# Patient Record
Sex: MALE | Race: WHITE | Employment: OTHER | URBAN - METROPOLITAN AREA
[De-identification: names, ages, dates, MRNs, and addresses within clinical notes are randomized per-mention and may not be internally consistent; named-entity substitution may affect disease eponyms.]

---

## 2021-10-07 ENCOUNTER — HOSPITAL ENCOUNTER (OUTPATIENT)
Dept: GENERAL RADIOLOGY | Age: 61
Discharge: HOME | End: 2021-10-09
Payer: MEDICARE

## 2021-10-07 ENCOUNTER — HOSPITAL ENCOUNTER (OUTPATIENT)
Age: 61
Discharge: HOME | End: 2021-10-09
Payer: MEDICARE

## 2021-10-07 DIAGNOSIS — M25.552 LEFT HIP PAIN: ICD-10-CM

## 2021-10-07 DIAGNOSIS — M23.92 DERANGEMENT OF COLLATERAL LIGAMENT OF LEFT KNEE: ICD-10-CM

## 2021-10-07 PROCEDURE — 73562 X-RAY EXAM OF KNEE 3: CPT

## 2021-10-07 PROCEDURE — 73502 X-RAY EXAM HIP UNI 2-3 VIEWS: CPT

## 2021-10-07 PROCEDURE — 72100 X-RAY EXAM L-S SPINE 2/3 VWS: CPT

## 2021-10-13 ENCOUNTER — OFFICE VISIT (OUTPATIENT)
Dept: ORTHOPEDIC SURGERY | Age: 61
End: 2021-10-13
Payer: MEDICARE

## 2021-10-13 VITALS — BODY MASS INDEX: 30.76 KG/M2 | HEIGHT: 67 IN | WEIGHT: 196 LBS

## 2021-10-13 DIAGNOSIS — M25.562 LEFT KNEE PAIN, UNSPECIFIED CHRONICITY: ICD-10-CM

## 2021-10-13 DIAGNOSIS — M17.12 PRIMARY OSTEOARTHRITIS OF LEFT KNEE: Primary | ICD-10-CM

## 2021-10-13 PROCEDURE — 99203 OFFICE O/P NEW LOW 30 MIN: CPT | Performed by: ORTHOPAEDIC SURGERY

## 2021-10-13 RX ORDER — FUROSEMIDE 20 MG/1
TABLET ORAL
COMMUNITY
Start: 2021-10-09

## 2021-10-13 RX ORDER — TAMSULOSIN HYDROCHLORIDE 0.4 MG/1
CAPSULE ORAL
COMMUNITY

## 2021-10-13 RX ORDER — VIT A/VIT C/VIT E/ZINC/COPPER 4296-226
CAPSULE ORAL DAILY
COMMUNITY

## 2021-10-13 NOTE — PROGRESS NOTES
LEFT KNEE TOTAL JOINT ARTHROPLASTY-Sam BRADLEY, [employer] - Advanced, general versus spinal anesthesia, Adductor Nerve Block, Date: Monday, November 8, 2021, Time: 11:30 am, Place: [address] [place] [address], Surgeon: Naman Can. Wayne . Reviewed medications with patient / holding no medications. Will inform PCP: Lubna Redding of plans for surgery. Patient does not have regular checkups with the dentist. Patient has dentures, upper and lower. Dietary Handout: Patient Education Guide Regarding Iron Replacement given to and reviewed with patient. Patient instructed to begin eating foods rich in Iron and Vitamin C one month pre op and continue for one month post op. Pre op instructions and Total Joint Folder given to the patient. Patient informed: A hospital nurse from Pre Admission Testing will contact him with a date for Pre Admission Testing and Mandatory Joint Class. Patient expresses understanding and is in agreement with the plan. After review of the pre op information at home; patient will call office with any questions, concerns or problems. Patient also informed: X-ray department from [facility] will contact him regarding a date and time CT scan which is required for Robot Assisted Surgery    Patient reports he saw a heart doctor (not in epic) this summer for chest pain.  A stress test, echocardiogram and 24 hr halter monitor - all negative    Patient has not had the Covid vaccine    Patient lives in a [facility] home, [facility] style, does not utilize second floor,   Has no steps to navigate  Uses a cane for ambulation when away from home    DME Needs: None    Post Op Appointment: Thursday, November 18 th at 2:45 pm

## 2021-10-13 NOTE — PROGRESS NOTES
Chief Complaint:   Chief Complaint   Patient presents with    Knee Pain     Left knee pain for several years progressively getting worse. States he has had several injuries over the years to left knee. Knee gives out, wearing hinged knee brace. Also c/o left hip and thigh pain. No xrays. Takes Advil or Aleve once in a while for pain. HPI     Alvin Weinberg is a 63 y.o. male, who presents with chronic many year history of progressive left knee pain, reports multiple nondescript injuries in the remote past, he is on permanent disability due to previous neck and back injuries although asymptomatic in that regard at this time. Left knee pain has not improved despite oral medication including OTC NSAIDs or Tylenol, patient does not wish to pursue injections, or physical therapy. Knee pain is constant aggravated by and disabling him from simple ADLs including standing walking stairs transfers and self-care. He has been wearing a brace with minimal relief. Allergies; medications; past medical, surgical, family, and social history; and problem list have been reviewed today and updated as indicated in this encounter - see below following Ortho specifics. Musculoskeletal: Overweight otherwise healthy-appearing middle-age male. Upper extremities grossly unremarkable leg lengths equal hip motion painless right knee benign, left knee is in valgus alignment partially correctable, motion limited from 5 to 120 degrees of flexion without medial lateral AP laxity. There is crepitus but no effusion or erythema. Knee is tender to palpation especially at the lateral joint line.     Radiologic Studies: Recent 4 view x-rays of the left knee show moderate patellofemoral wear and lateral condyle changes but preserved joint space, weightbearing AP x-rays today of the knees in fact show complete loss of lateral joint space with valgus deformity sclerosis and osteophyte formation consistent with end-stage valgus DJD left knee. ASSESSMENT/PLAN:    Sunday Hernandes was seen today for knee pain. Diagnoses and all orders for this visit:    Primary osteoarthritis of left knee    Left knee pain, unspecified chronicity  -     XR KNEE BILATERAL STANDING       Diagnosis and treatment options were reviewed, again the patient has taken oral medication and modified activities without relief, he does not wish to pursue injections or physical therapy and would rather prefer a more definitive approach. I reviewed the alternative of robotic assisted total knee arthroplasty with him including expected procedure likely risk benefits probable outcome, questions were asked answered and understood. At the patient's request this to be scheduled on a likely overnight stay basis and he will follow up in the office post discharge to include x-rays left knee. Return in about 27 days (around 11/9/2021). Myesha Landin MD    10/13/2021  5:34 PM      Patient Active Problem List   Diagnosis    Acute kidney injury ([facility] [address]    High anion gap metabolic acidosis    Essential hypertension    Hyperlipidemia LDL goal <100       Past Medical History:   Diagnosis Date    Hyperlipidemia     Hypertension     Leaky heart valve     Phalanx, proximal fracture of finger        Past Surgical History:   Procedure Laterality Date    BACK SURGERY      HAND SURGERY Right 02/04/2015    right hand debridment second proximal phalanx       Current Outpatient Medications   Medication Sig Dispense Refill    furosemide (LASIX) 20 MG tablet TAKE ONE TABLET BY MOUTH EVERY DAY      Multiple Vitamins-Minerals (PRESERVISION AREDS) CAPS Take by mouth daily       tamsulosin (FLOMAX) 0.4 MG capsule tamsulosin 0.4 mg capsule   TAKE ONE CAPSULE BY MOUTH EVERY DAY      gabapentin (NEURONTIN) 400 MG capsule Take 400 mg by mouth 3 times daily.  simvastatin (ZOCOR) 40 MG tablet Take 40 mg by mouth daily.  (Patient not taking: Reported on 10/13/2021)       No current facility-administered medications for this visit. No Known Allergies    Social History     Socioeconomic History    Marital status:      Spouse name: None    Number of children: None    Years of education: None    Highest education level: None   Occupational History    None   Tobacco Use    Smoking status: Current Every Day Smoker     Packs/day: 2.00     Years: 40.00     Pack years: 80.00    Smokeless tobacco: Never Used   Substance and Sexual Activity    Alcohol use: No    Drug use: No    Sexual activity: None   Other Topics Concern    None   Social History Narrative    None     Social Determinants of Health     Financial Resource Strain:     Difficulty of Paying Living Expenses:    Food Insecurity:     Worried About Running Out of Food in the Last Year:     Ran Out of Food in the Last Year:    Transportation Needs:     Lack of Transportation (Medical):  Lack of Transportation (Non-Medical):    Physical Activity:     Days of Exercise per Week:     Minutes of Exercise per Session:    Stress:     Feeling of Stress :    Social Connections:     Frequency of Communication with Friends and Family:     Frequency of Social Gatherings with Friends and Family:     Attends Sikh Services:     Active Member of Clubs or Organizations:     Attends Club or Organization Meetings:     Marital Status:    Intimate Partner Violence:     Fear of Current or Ex-Partner:     Emotionally Abused:     Physically Abused:     Sexually Abused:        No family history on file. Review of Systems  As follows except as previously noted in HPI:  Constitutional: Negative for chills, diaphoresis, fatigue, fever and unexpected weight change. Respiratory: Negative for cough, shortness of breath and wheezing. Cardiovascular: Negative for chest pain and palpitations. Neurological: Negative for dizziness, syncope, cephalgia.   GI / : negative  Musculoskeletal: see HPI       Objective: Physical Exam   Constitutional: Oriented to person, place, and time. and appears well-developed and well-nourished. :   Head: Normocephalic and atraumatic. Eyes: EOM are normal.   Neck: Neck supple. Cardiovascular: Normal rate and regular rhythm. Pulmonary/Chest: Effort normal. No stridor. No respiratory distress, no wheezes. Abdominal:  No abnormal distension. Neurological: Alert and oriented to person, place, and time. Skin: Skin is warm and dry. Psychiatric: Normal mood and affect.  Behavior is normal. Thought content normal.

## 2021-10-15 ENCOUNTER — TELEPHONE (OUTPATIENT)
Dept: ORTHOPEDIC SURGERY | Age: 61
End: 2021-10-15

## 2021-10-15 DIAGNOSIS — M17.12 PRIMARY OSTEOARTHRITIS OF LEFT KNEE: Primary | ICD-10-CM

## 2021-10-15 DIAGNOSIS — M25.562 LEFT KNEE PAIN, UNSPECIFIED CHRONICITY: ICD-10-CM

## 2021-10-18 ENCOUNTER — TELEPHONE (OUTPATIENT)
Dept: ORTHOPEDIC SURGERY | Age: 61
End: 2021-10-18

## 2021-10-18 NOTE — TELEPHONE ENCOUNTER
10/18/2021 Pre-Optimization Checklist faxed to [facility], nurse navigator, [facility], [place], [phone]  Surgery: BELINDA BRADLEY 11/08/2021 [facility]

## 2021-10-18 NOTE — TELEPHONE ENCOUNTER
10/18/2021 9:35 am Fax message to Dr. Rei Cheung [phone]: Notification of plans for surgery - BELINDA BRADLEY 11/08/2021 [facility]. Request Medical clearance for surgery. Other: Patient reports he recently saw a cardiologist for chest pain. Had a workup: Stress test, echocardiogram, halter monitor. Patient reports everything was 'OK'. Please send reports to Fax # [phone].   Holding the following medications for surgery: None

## 2021-10-19 ENCOUNTER — TELEPHONE (OUTPATIENT)
Dept: ORTHOPEDIC SURGERY | Age: 61
End: 2021-10-19

## 2021-10-19 NOTE — TELEPHONE ENCOUNTER
Prior Authorization    Procedure:  Left TKA [facility])  Procedure Code: 18818  Auth Number:  No prior auth required  Diagnosis Code:  M17.12  Date:  11/8/2021  Facility:  [address] [place] [address]  Status: OBS  Physician:  Marky Awad M.D.   Call Reference Number:  [id number]  Contact: Fouzia Rios

## 2021-10-25 ENCOUNTER — HOSPITAL ENCOUNTER (OUTPATIENT)
Dept: CT IMAGING | Age: 61
Discharge: HOME | End: 2021-10-27
Payer: MEDICARE

## 2021-10-25 DIAGNOSIS — M25.562 LEFT KNEE PAIN, UNSPECIFIED CHRONICITY: ICD-10-CM

## 2021-10-25 DIAGNOSIS — M17.12 PRIMARY OSTEOARTHRITIS OF LEFT KNEE: ICD-10-CM

## 2021-10-25 PROCEDURE — 73700 CT LOWER EXTREMITY W/O DYE: CPT

## 2021-10-28 ENCOUNTER — TELEPHONE (OUTPATIENT)
Dept: ORTHOPEDIC SURGERY | Age: 61
End: 2021-10-28

## 2021-10-28 NOTE — TELEPHONE ENCOUNTER
Late Entry 10/28/2021    10/27/2021 3:35 pm Received a fax from [facility], [facility] [address], [facility], Phone: [phone] Fax: [phone] which includes:    - A copy of office notes dated 10/27/2021 and signed by Dr. Surendra Mccallum. Encounter was for Surgery Clearance TLK 11/08/2021. Will clear at low risk for left total knee. Patient is also tobacco dependent, patient was counseled on smoking cessation, long term ramifications were reviewed. - Report of a Stress Test dated 9/01/21, ordered by Dr. Breezy Joaquin, cardiologist, completed at [facility].  - Report of a Transthoracic Echocardiography ([facility])  Dated 9/15/2021, ordered by Dr. Rae Green and completed at [facility].   - Patch Monitoring Report    Above scanned to media

## 2021-11-02 ENCOUNTER — PREP FOR PROCEDURE (OUTPATIENT)
Dept: ORTHOPEDIC SURGERY | Age: 61
End: 2021-11-02

## 2021-11-02 DIAGNOSIS — Z01.818 PRE-OP EVALUATION: Primary | ICD-10-CM

## 2021-11-02 RX ORDER — MELOXICAM 7.5 MG/1
7.5 TABLET ORAL ONCE
Status: CANCELLED | OUTPATIENT
Start: 2021-11-02 | End: 2021-11-02

## 2021-11-02 RX ORDER — SODIUM CHLORIDE 9 MG/ML
INJECTION, SOLUTION INTRAVENOUS CONTINUOUS
Status: CANCELLED | OUTPATIENT
Start: 2021-11-02

## 2021-11-02 RX ORDER — PREGABALIN 25 MG/1
75 CAPSULE ORAL ONCE
Status: CANCELLED | OUTPATIENT
Start: 2021-11-02 | End: 2021-11-02

## 2021-11-02 RX ORDER — SODIUM CHLORIDE 0.9 % (FLUSH) 0.9 %
10 SYRINGE (ML) INJECTION PRN
Status: CANCELLED | OUTPATIENT
Start: 2021-11-02

## 2021-11-02 RX ORDER — SODIUM CHLORIDE 9 MG/ML
25 INJECTION, SOLUTION INTRAVENOUS PRN
Status: CANCELLED | OUTPATIENT
Start: 2021-11-02

## 2021-11-02 RX ORDER — SODIUM CHLORIDE 0.9 % (FLUSH) 0.9 %
10 SYRINGE (ML) INJECTION EVERY 12 HOURS SCHEDULED
Status: CANCELLED | OUTPATIENT
Start: 2021-11-02

## 2021-11-02 RX ORDER — ACETAMINOPHEN 325 MG/1
1000 TABLET ORAL ONCE
Status: CANCELLED | OUTPATIENT
Start: 2021-11-02 | End: 2021-11-02

## 2021-11-03 ENCOUNTER — HOSPITAL ENCOUNTER (OUTPATIENT)
Age: 61
Discharge: HOME | End: 2021-11-05
Payer: MEDICARE

## 2021-11-03 ENCOUNTER — ANESTHESIA EVENT (OUTPATIENT)
Dept: OPERATING ROOM | Age: 61
End: 2021-11-03
Payer: MEDICARE

## 2021-11-03 ENCOUNTER — HOSPITAL ENCOUNTER (OUTPATIENT)
Dept: PREADMISSION TESTING | Age: 61
Discharge: HOME | End: 2021-11-03
Payer: MEDICARE

## 2021-11-03 ENCOUNTER — NURSE ONLY (OUTPATIENT)
Dept: PRIMARY CARE CLINIC | Age: 61
End: 2021-11-03

## 2021-11-03 VITALS
WEIGHT: 199 LBS | BODY MASS INDEX: 31.98 KG/M2 | HEIGHT: 66 IN | HEART RATE: 78 BPM | DIASTOLIC BLOOD PRESSURE: 97 MMHG | TEMPERATURE: 98 F | SYSTOLIC BLOOD PRESSURE: 141 MMHG | OXYGEN SATURATION: 98 % | RESPIRATION RATE: 18 BRPM

## 2021-11-03 DIAGNOSIS — Z01.818 PREOP TESTING: ICD-10-CM

## 2021-11-03 DIAGNOSIS — Z01.818 PRE-OP EVALUATION: ICD-10-CM

## 2021-11-03 LAB
ANION GAP SERPL CALCULATED.3IONS-SCNC: 12 MMOL/L (ref 7–16)
BASOPHILS ABSOLUTE: 0.04 E9/L (ref 0–0.2)
BASOPHILS RELATIVE PERCENT: 0.5 % (ref 0–2)
BUN BLDV-MCNC: 12 MG/DL (ref 6–20)
CALCIUM SERPL-MCNC: 9.5 MG/DL (ref 8.6–10.2)
CHLORIDE BLD-SCNC: 100 MMOL/L (ref 98–107)
CO2: 27 MMOL/L (ref 22–29)
CREAT SERPL-MCNC: 0.9 MG/DL (ref 0.7–1.2)
EOSINOPHILS ABSOLUTE: 0.16 E9/L (ref 0.05–0.5)
EOSINOPHILS RELATIVE PERCENT: 2 % (ref 0–6)
GFR AFRICAN AMERICAN: >60
GFR NON-AFRICAN AMERICAN: >60 ML/MIN/1.73
GLUCOSE BLD-MCNC: 110 MG/DL (ref 74–99)
HCT VFR BLD CALC: 51.6 % (ref 37–54)
HEMOGLOBIN: 17.1 G/DL (ref 12.5–16.5)
IMMATURE GRANULOCYTES #: 0.06 E9/L
IMMATURE GRANULOCYTES %: 0.8 % (ref 0–5)
LYMPHOCYTES ABSOLUTE: 2.24 E9/L (ref 1.5–4)
LYMPHOCYTES RELATIVE PERCENT: 28.5 % (ref 20–42)
MCH RBC QN AUTO: 31.1 PG (ref 26–35)
MCHC RBC AUTO-ENTMCNC: 33.1 % (ref 32–34.5)
MCV RBC AUTO: 94 FL (ref 80–99.9)
MONOCYTES ABSOLUTE: 0.66 E9/L (ref 0.1–0.95)
MONOCYTES RELATIVE PERCENT: 8.4 % (ref 2–12)
NEUTROPHILS ABSOLUTE: 4.71 E9/L (ref 1.8–7.3)
NEUTROPHILS RELATIVE PERCENT: 59.8 % (ref 43–80)
PDW BLD-RTO: 13.5 FL (ref 11.5–15)
PLATELET # BLD: 176 E9/L (ref 130–450)
PMV BLD AUTO: 10.7 FL (ref 7–12)
POTASSIUM REFLEX MAGNESIUM: 4 MMOL/L (ref 3.5–5)
RBC # BLD: 5.49 E12/L (ref 3.8–5.8)
SODIUM BLD-SCNC: 139 MMOL/L (ref 132–146)
WBC # BLD: 7.9 E9/L (ref 4.5–11.5)

## 2021-11-03 PROCEDURE — 87081 CULTURE SCREEN ONLY: CPT

## 2021-11-03 PROCEDURE — 80048 BASIC METABOLIC PNL TOTAL CA: CPT

## 2021-11-03 PROCEDURE — 36415 COLL VENOUS BLD VENIPUNCTURE: CPT

## 2021-11-03 PROCEDURE — 85025 COMPLETE CBC W/AUTO DIFF WBC: CPT

## 2021-11-03 PROCEDURE — U0005 INFEC AGEN DETEC AMPLI PROBE: HCPCS

## 2021-11-03 PROCEDURE — U0003 INFECTIOUS AGENT DETECTION BY NUCLEIC ACID (DNA OR RNA); SEVERE ACUTE RESPIRATORY SYNDROME CORONAVIRUS 2 (SARS-COV-2) (CORONAVIRUS DISEASE [COVID-19]), AMPLIFIED PROBE TECHNIQUE, MAKING USE OF HIGH THROUGHPUT TECHNOLOGIES AS DESCRIBED BY CMS-2020-01-R: HCPCS

## 2021-11-03 RX ORDER — LIDOCAINE HYDROCHLORIDE 10 MG/ML
5 INJECTION, SOLUTION EPIDURAL; INFILTRATION; INTRACAUDAL; PERINEURAL ONCE
Status: CANCELLED | OUTPATIENT
Start: 2021-11-03 | End: 2021-11-03

## 2021-11-03 RX ORDER — ROPIVACAINE HYDROCHLORIDE 5 MG/ML
30 INJECTION, SOLUTION EPIDURAL; INFILTRATION; PERINEURAL ONCE
Status: CANCELLED | OUTPATIENT
Start: 2021-11-03 | End: 2021-11-03

## 2021-11-03 RX ORDER — MIDAZOLAM HYDROCHLORIDE 2 MG/2ML
1 INJECTION, SOLUTION INTRAMUSCULAR; INTRAVENOUS EVERY 5 MIN PRN
Status: CANCELLED | OUTPATIENT
Start: 2021-11-03

## 2021-11-03 RX ORDER — FENTANYL CITRATE 50 UG/ML
50 INJECTION, SOLUTION INTRAMUSCULAR; INTRAVENOUS ONCE
Status: CANCELLED | OUTPATIENT
Start: 2021-11-03 | End: 2021-11-03

## 2021-11-03 RX ORDER — NAPROXEN 250 MG/1
250 TABLET ORAL 2 TIMES DAILY WITH MEALS
Status: ON HOLD | COMMUNITY
End: 2021-11-09 | Stop reason: HOSPADM

## 2021-11-03 ASSESSMENT — PAIN DESCRIPTION - LOCATION: LOCATION: KNEE

## 2021-11-03 ASSESSMENT — KOOS JR
STANDING UPRIGHT: 4
RISING FROM SITTING: 4
TWISING OR PIVOTING ON KNEE: 3
BENDING TO THE FLOOR TO PICK UP OBJECT: 4
HOW SEVERE IS YOUR KNEE STIFFNESS AFTER FIRST WAKING IN MORNING: 3
STRAIGHTENING KNEE FULLY: 3
GOING UP OR DOWN STAIRS: 3

## 2021-11-03 ASSESSMENT — PAIN SCALES - GENERAL: PAINLEVEL_OUTOF10: 6

## 2021-11-03 ASSESSMENT — PAIN DESCRIPTION - ORIENTATION: ORIENTATION: LEFT

## 2021-11-03 ASSESSMENT — PAIN DESCRIPTION - ONSET: ONSET: AWAKENED FROM SLEEP

## 2021-11-03 ASSESSMENT — LIFESTYLE VARIABLES: SMOKING_STATUS: 1

## 2021-11-03 ASSESSMENT — PAIN DESCRIPTION - PAIN TYPE: TYPE: CHRONIC PAIN

## 2021-11-03 ASSESSMENT — PAIN DESCRIPTION - DESCRIPTORS: DESCRIPTORS: SHARP;ACHING;THROBBING

## 2021-11-03 ASSESSMENT — PAIN DESCRIPTION - FREQUENCY: FREQUENCY: CONTINUOUS

## 2021-11-03 ASSESSMENT — PAIN DESCRIPTION - PROGRESSION: CLINICAL_PROGRESSION: GRADUALLY WORSENING

## 2021-11-03 ASSESSMENT — ENCOUNTER SYMPTOMS: SHORTNESS OF BREATH: 1

## 2021-11-03 NOTE — ANESTHESIA PRE PROCEDURE
Department of Anesthesiology  Preprocedure Note       Name:  Zeny Freedman   Age:  63 y.o.  :  1960                                          MRN:  [id number]         Date:  11/3/2021      Surgeon: Lynn Carlos):  Wali Palacios MD    Procedure: Procedure(s):  ROBOTIC ASSISTED KIRK LEFT KNEE TOTAL ARTHROPLASTY   +++[facility]EVENSON+++   ++ADVANCED++   ++PNB++    Medications prior to admission:   Prior to Admission medications    Medication Sig Start Date End Date Taking? Authorizing Provider   furosemide (LASIX) 20 MG tablet TAKE ONE TABLET BY MOUTH EVERY DAY 10/9/21   Historical Provider, MD   Multiple Vitamins-Minerals (PRESERVISION AREDS) CAPS Take by mouth daily     Historical Provider, MD   tamsulosin (FLOMAX) 0.4 MG capsule tamsulosin 0.4 mg capsule   TAKE ONE CAPSULE BY MOUTH EVERY DAY    Historical Provider, MD   gabapentin (NEURONTIN) 400 MG capsule Take 400 mg by mouth 3 times daily. Historical Provider, MD   simvastatin (ZOCOR) 40 MG tablet Take 40 mg by mouth daily. Patient not taking: Reported on 10/13/2021    Historical Provider, MD       Current medications:    Current Outpatient Medications   Medication Sig Dispense Refill    furosemide (LASIX) 20 MG tablet TAKE ONE TABLET BY MOUTH EVERY DAY      Multiple Vitamins-Minerals (PRESERVISION AREDS) CAPS Take by mouth daily       tamsulosin (FLOMAX) 0.4 MG capsule tamsulosin 0.4 mg capsule   TAKE ONE CAPSULE BY MOUTH EVERY DAY      gabapentin (NEURONTIN) 400 MG capsule Take 400 mg by mouth 3 times daily.  simvastatin (ZOCOR) 40 MG tablet Take 40 mg by mouth daily. (Patient not taking: Reported on 10/13/2021)       No current facility-administered medications for this encounter.        Allergies:  No Known Allergies    Problem List:    Patient Active Problem List   Diagnosis Code    Acute kidney injury ([facility] [address] N17.9    High anion gap metabolic acidosis T00.0    Essential hypertension I10    Hyperlipidemia LDL goal <100 E78.5       Past [facility]    Anesthetic plan, risks, benefits, alternatives, and personnel involved discussed with patient. Patient verbalized an understanding and agrees to proceed. Plan discussed with care team members and agreed upon.     Chastity Feliz MD  Staff Anesthesiologist  9:54 AM

## 2021-11-03 NOTE — PROGRESS NOTES
I met with Jose Luis Szymanski and his wife this am for an orthopaedic education class. We discussed information regarding pre, intra, post-op, discharge, and LOS expectations. I presented the Total Knee video, and provided ortho education materials. I encouraged the patient to call with any questions or concerns.

## 2021-11-03 NOTE — PROGRESS NOTES
[facility] PRE-ADMISSION TESTING INSTRUCTIONS    The Preadmission Testing patient is instructed accordingly using the following criteria (check applicable):    ARRIVAL INSTRUCTIONS:  [x] Parking the day of Surgery is located in the Main Entrance lot. Upon entering the door, make an immediate right to the surgery reception desk    [x] Bring photo ID and insurance card    [] Bring in a copy of Living will or Durable Power of  papers. [x] Please be sure to arrange for responsible adult to provide transportation to and from the hospital    [] Please arrange for responsible adult to be with you for the 24 hour period post procedure due to having anesthesia      GENERAL INSTRUCTIONS:    [x] Nothing by mouth after midnight, including gum, candy, mints or water    [x] You may brush your teeth, but do not swallow any water    [x] Take medications as instructed with 1-2 oz of water    [x] Stop herbal supplements and vitamins 5 days prior to procedure    [] Follow preop dosing of blood thinners per physician instructions    [] Take 1/2 dose of evening insulin, but no insulin after midnight    [] No oral diabetic medications after midnight    [] If diabetic and have low blood sugar or feel symptomatic, take 1-2oz apple juice only    [] Bring inhalers day of surgery    [] Bring C-PAP/ Bi-Pap day of surgery    [] Bring urine specimen day of surgery    [] Shower or bath with soap, lather and rinse well, AM of Surgery, no lotion, powders or creams to surgical site    [] Follow bowel prep as instructed per surgeon    [x] No tobacco products within 24 hours of surgery     [x] No alcohol or illegal drug use within 24 hours of surgery.     [x] Jewelry, body piercing's, eyeglasses, contact lenses and dentures are not permitted into surgery (bring cases)      [x] Please do not wear any nail polish, make up or hair products on the day of surgery    [x] You can expect a call the business day prior to procedure to notify you if your arrival time changes    [x] If you receive a survey after surgery we would greatly appreciate your comments    [] Parent/guardian of a minor must accompany their child and remain on the premises  the entire time they are under our care     [] Pediatric patients may bring favorite toy, blanket or comfort item with them    [] A caregiver or family member must remain with the patient during their stay if they are mentally handicapped, have dementia, disoriented or unable to use a call light or would be a safety concern if left unattended    [x] Please notify surgeon if you develop any illness between now and time of surgery (cold, cough, sore throat, fever, nausea, vomiting) or any signs of infections  including skin, wounds, and dental.    [x]  The Outpatient Pharmacy is available to fill your prescription here on your day of surgery, ask your preop nurse for details    [] Other instructions    EDUCATIONAL MATERIALS PROVIDED:    [x] PAT Preoperative Education Packet/Booklet     [x] Medication List    [] Transfusion bracelet applied with instructions    [x] Shower with soap, lather and rinse well, and use CHG wipes provided the evening before surgery as instructed    [x] Incentive spirometer with instructions        Have you been tested for COVID  No           Have you been told you were positive for COVID No  Have you had any known exposure to someone that is positive for COVID No  Do you have a cough                   Yes smoker           Do you have shortness of breath No                 Do you have a sore throat            No                Are you having chills                    No                Are you having muscle aches. no                 Please come to the hospital wearing a mask and have your significant other wear a mask as well. Both of you should check your temperature before leaving to come here,  if it is 100 or higher please call [phone] for instruction.

## 2021-11-04 LAB — SARS-COV-2, PCR: NOT DETECTED

## 2021-11-05 LAB — MRSA CULTURE ONLY: NORMAL

## 2021-11-05 NOTE — H&P (VIEW-ONLY)
Department of Orthopedic Surgery  Attending History and Physical        CHIEF COMPLAINT:  Left knee pain    Reason for Admission:  Left total knee replacement    History Obtained From: patient, electronic medical record    HISTORY OF PRESENT ILLNESS:      The patient is a 63 y.o. male with significant past medical history of hypertension who presents with chronic many year history of progressive left knee pain, reports multiple nondescript injuries in the remote past, he is on permanent disability due to previous neck and back injuries although asymptomatic in that regard at this time. Left knee pain has not improved despite oral medication including OTC NSAIDs or Tylenol, patient does not wish to pursue injections, or physical therapy. Knee pain is constant aggravated by and disabling him from simple ADLs including standing walking stairs transfers and self-care. He has been wearing a brace with minimal relief.  He presents now for left TKA>    Past Medical History:        Diagnosis Date    Hyperlipidemia     Hypertension     Knee pain     left For OR 11-8-21    Leaky heart valve      Past Surgical History:        Procedure Laterality Date    BACK SURGERY  2008    disc    CERVICAL FUSION  2016    HAND SURGERY Right 02/04/2015    right hand debridment second proximal phalanx     Immunizations:              Influenza:  Not indicated            Pneumococcal Polysaccharide:  Not indicated    Medications Prior to Admission:     Current Outpatient Medications:     naproxen (NAPROSYN) 250 MG tablet, Take 250 mg by mouth 2 times daily (with meals), Disp: , Rfl:     furosemide (LASIX) 20 MG tablet, TAKE ONE TABLET BY MOUTH EVERY DAY, Disp: , Rfl:     Multiple Vitamins-Minerals (PRESERVISION AREDS) CAPS, Take by mouth daily , Disp: , Rfl:     tamsulosin (FLOMAX) 0.4 MG capsule, tamsulosin 0.4 mg capsule  TAKE ONE CAPSULE BY MOUTH EVERY DAY, Disp: , Rfl:     gabapentin (NEURONTIN) 400 MG capsule, Take 400 mg by mouth 3 times daily. , Disp: , Rfl:       Allergies:  Patient has no known allergies. Social History:   TOBACCO:   reports that he has been smoking. He has a 80.00 pack-year smoking history. He has never used smokeless tobacco.  ETOH:   reports current alcohol use. Patient currently lives In a private home  Family History:   No family history on file. REVIEW OF SYSTEMS:  Constitutional: Negative for chills, diaphoresis, fatigue, fever and unexpected weight change. Respiratory: Negative for cough, shortness of breath and wheezing. Cardiovascular: Negative for chest pain and palpitations. Neurological: Negative for dizziness, syncope, weakness and numbness. Musculoskeletal: see HPI     PHYSICAL EXAM:  VITALS:  There were no vitals taken for this visit. CONSTITUTIONAL:  Overweight otherwise healthy-appearing middle-age male. NECK:  supple, symmetrical, trachea midline  LUNGS:  clear  CARDIOVASCULAR:  Regular rate and rhythm  MUSCULOSKELETAL:  Upper extremities grossly unremarkable leg lengths equal hip motion painless right knee benign, left knee is in valgus alignment partially correctable, motion limited from 5 to 120 degrees of flexion without medial lateral AP laxity. There is crepitus but no effusion or erythema. Knee is tender to palpation especially at the lateral joint line.   NEUROLOGIC:  Grossly intact motor and sensation  Mental Status Exam:  Level of Alertness:   awake  Orientation:   person, place, time    DATA:  CBC with Differential:    Lab Results   Component Value Date    WBC 7.9 11/03/2021    RBC 5.49 11/03/2021    HGB 17.1 11/03/2021    HCT 51.6 11/03/2021     11/03/2021    MCV 94.0 11/03/2021    MCH 31.1 11/03/2021    MCHC 33.1 11/03/2021    RDW 13.5 11/03/2021    LYMPHOPCT 28.5 11/03/2021    MONOPCT 8.4 11/03/2021    BASOPCT 0.5 11/03/2021    MONOSABS 0.66 11/03/2021    LYMPHSABS 2.24 11/03/2021    EOSABS 0.16 11/03/2021    BASOSABS 0.04 11/03/2021       Radiographs:  Recent 4 view x-rays of the left knee show moderate patellofemoral wear and lateral condyle changes but preserved joint space, weightbearing AP x-rays today of the knees in fact show complete loss of lateral joint space with valgus deformity sclerosis and osteophyte formation consistent with end-stage valgus DJD left knee. ASSESSMENT AND PLAN:    Primary osteoarthritis of left knee        Diagnosis and treatment options were reviewed, again the patient has taken oral medication and modified activities without relief, he does not wish to pursue injections or physical therapy and would rather prefer a more definitive approach. I reviewed the alternative of robotic assisted total knee arthroplasty with him including expected procedure likely risk benefits probable outcome, questions were asked answered and understood. At the patient's request this to be scheduled on a likely overnight stay basis.

## 2021-11-08 ENCOUNTER — ANESTHESIA (OUTPATIENT)
Dept: OPERATING ROOM | Age: 61
End: 2021-11-08
Payer: MEDICARE

## 2021-11-08 ENCOUNTER — HOSPITAL ENCOUNTER (OUTPATIENT)
Age: 61
Setting detail: OBSERVATION
Discharge: HOME HEALTH | End: 2021-11-09
Attending: ORTHOPAEDIC SURGERY | Admitting: ORTHOPAEDIC SURGERY
Payer: MEDICARE

## 2021-11-08 VITALS — OXYGEN SATURATION: 97 % | TEMPERATURE: 96.3 F | SYSTOLIC BLOOD PRESSURE: 95 MMHG | DIASTOLIC BLOOD PRESSURE: 51 MMHG

## 2021-11-08 DIAGNOSIS — Z96.652 S/P TOTAL KNEE ARTHROPLASTY, LEFT: ICD-10-CM

## 2021-11-08 DIAGNOSIS — Z01.818 PREOP TESTING: Primary | ICD-10-CM

## 2021-11-08 PROCEDURE — 88311 DECALCIFY TISSUE: CPT

## 2021-11-08 PROCEDURE — 3700000000 HC ANESTHESIA ATTENDED CARE: Performed by: ORTHOPAEDIC SURGERY

## 2021-11-08 PROCEDURE — 3700000001 HC ADD 15 MINUTES (ANESTHESIA): Performed by: ORTHOPAEDIC SURGERY

## 2021-11-08 PROCEDURE — C1776 JOINT DEVICE (IMPLANTABLE): HCPCS | Performed by: ORTHOPAEDIC SURGERY

## 2021-11-08 PROCEDURE — 6360000002 HC RX W HCPCS: Performed by: ORTHOPAEDIC SURGERY

## 2021-11-08 PROCEDURE — 3600000015 HC SURGERY LEVEL 5 ADDTL 15MIN: Performed by: ORTHOPAEDIC SURGERY

## 2021-11-08 PROCEDURE — 99203 OFFICE O/P NEW LOW 30 MIN: CPT | Performed by: INTERNAL MEDICINE

## 2021-11-08 PROCEDURE — G0378 HOSPITAL OBSERVATION PER HR: HCPCS

## 2021-11-08 PROCEDURE — 2580000003 HC RX 258: Performed by: ORTHOPAEDIC SURGERY

## 2021-11-08 PROCEDURE — 6360000002 HC RX W HCPCS: Performed by: NURSE ANESTHETIST, CERTIFIED REGISTERED

## 2021-11-08 PROCEDURE — 2709999900 HC NON-CHARGEABLE SUPPLY: Performed by: ORTHOPAEDIC SURGERY

## 2021-11-08 PROCEDURE — 27447 TOTAL KNEE ARTHROPLASTY: CPT | Performed by: ORTHOPAEDIC SURGERY

## 2021-11-08 PROCEDURE — 7100000001 HC PACU RECOVERY - ADDTL 15 MIN: Performed by: ORTHOPAEDIC SURGERY

## 2021-11-08 PROCEDURE — 2500000003 HC RX 250 WO HCPCS: Performed by: NURSE ANESTHETIST, CERTIFIED REGISTERED

## 2021-11-08 PROCEDURE — 88305 TISSUE EXAM BY PATHOLOGIST: CPT

## 2021-11-08 PROCEDURE — 6360000002 HC RX W HCPCS: Performed by: ANESTHESIOLOGY

## 2021-11-08 PROCEDURE — C1713 ANCHOR/SCREW BN/BN,TIS/BN: HCPCS | Performed by: ORTHOPAEDIC SURGERY

## 2021-11-08 PROCEDURE — 3600000005 HC SURGERY LEVEL 5 BASE: Performed by: ORTHOPAEDIC SURGERY

## 2021-11-08 PROCEDURE — 2720000010 HC SURG SUPPLY STERILE: Performed by: ORTHOPAEDIC SURGERY

## 2021-11-08 PROCEDURE — 2500000003 HC RX 250 WO HCPCS: Performed by: ORTHOPAEDIC SURGERY

## 2021-11-08 PROCEDURE — 64447 NJX AA&/STRD FEMORAL NRV IMG: CPT | Performed by: ANESTHESIOLOGY

## 2021-11-08 PROCEDURE — 6370000000 HC RX 637 (ALT 250 FOR IP): Performed by: ORTHOPAEDIC SURGERY

## 2021-11-08 PROCEDURE — 7100000000 HC PACU RECOVERY - FIRST 15 MIN: Performed by: ORTHOPAEDIC SURGERY

## 2021-11-08 PROCEDURE — 20985 CPTR-ASST DIR MS PX: CPT | Performed by: ORTHOPAEDIC SURGERY

## 2021-11-08 DEVICE — BASEPLATE TIB SZ 6 KNEE TRITANIUM CEM PRI LO PROF TRIATHLON: Type: IMPLANTABLE DEVICE | Status: FUNCTIONAL

## 2021-11-08 DEVICE — IMPLANTABLE DEVICE: Type: IMPLANTABLE DEVICE | Status: FUNCTIONAL

## 2021-11-08 DEVICE — CEMENT BNE 40 GM RADIOPAQUE BA SIMPLEX P: Type: IMPLANTABLE DEVICE | Status: FUNCTIONAL

## 2021-11-08 RX ORDER — SODIUM CHLORIDE 0.9 % (FLUSH) 0.9 %
10 SYRINGE (ML) INJECTION PRN
Status: DISCONTINUED | OUTPATIENT
Start: 2021-11-08 | End: 2021-11-08 | Stop reason: HOSPADM

## 2021-11-08 RX ORDER — SODIUM CHLORIDE 0.9 % (FLUSH) 0.9 %
10 SYRINGE (ML) INJECTION EVERY 12 HOURS SCHEDULED
Status: DISCONTINUED | OUTPATIENT
Start: 2021-11-08 | End: 2021-11-08 | Stop reason: HOSPADM

## 2021-11-08 RX ORDER — SODIUM CHLORIDE 0.9 % (FLUSH) 0.9 %
10 SYRINGE (ML) INJECTION PRN
Status: DISCONTINUED | OUTPATIENT
Start: 2021-11-08 | End: 2021-11-09 | Stop reason: HOSPADM

## 2021-11-08 RX ORDER — ACETAMINOPHEN 325 MG/1
650 TABLET ORAL EVERY 6 HOURS
Status: DISCONTINUED | OUTPATIENT
Start: 2021-11-08 | End: 2021-11-09 | Stop reason: HOSPADM

## 2021-11-08 RX ORDER — FUROSEMIDE 20 MG/1
20 TABLET ORAL DAILY
Status: DISCONTINUED | OUTPATIENT
Start: 2021-11-08 | End: 2021-11-08

## 2021-11-08 RX ORDER — KETAMINE HYDROCHLORIDE 10 MG/ML
INJECTION, SOLUTION INTRAMUSCULAR; INTRAVENOUS PRN
Status: DISCONTINUED | OUTPATIENT
Start: 2021-11-08 | End: 2021-11-08 | Stop reason: SDUPTHER

## 2021-11-08 RX ORDER — FENTANYL CITRATE 50 UG/ML
50 INJECTION, SOLUTION INTRAMUSCULAR; INTRAVENOUS ONCE
Status: COMPLETED | OUTPATIENT
Start: 2021-11-08 | End: 2021-11-08

## 2021-11-08 RX ORDER — DEXAMETHASONE SODIUM PHOSPHATE 10 MG/ML
8 INJECTION, SOLUTION INTRAMUSCULAR; INTRAVENOUS ONCE
Status: DISCONTINUED | OUTPATIENT
Start: 2021-11-08 | End: 2021-11-08 | Stop reason: HOSPADM

## 2021-11-08 RX ORDER — MORPHINE SULFATE 2 MG/ML
2 INJECTION, SOLUTION INTRAMUSCULAR; INTRAVENOUS
Status: DISCONTINUED | OUTPATIENT
Start: 2021-11-08 | End: 2021-11-09 | Stop reason: HOSPADM

## 2021-11-08 RX ORDER — TAMSULOSIN HYDROCHLORIDE 0.4 MG/1
0.4 CAPSULE ORAL DAILY
Status: DISCONTINUED | OUTPATIENT
Start: 2021-11-08 | End: 2021-11-09 | Stop reason: HOSPADM

## 2021-11-08 RX ORDER — OXYCODONE HYDROCHLORIDE 5 MG/1
5 TABLET ORAL EVERY 4 HOURS PRN
Status: DISCONTINUED | OUTPATIENT
Start: 2021-11-08 | End: 2021-11-09 | Stop reason: HOSPADM

## 2021-11-08 RX ORDER — ACETAMINOPHEN 500 MG
1000 TABLET ORAL ONCE
Status: COMPLETED | OUTPATIENT
Start: 2021-11-08 | End: 2021-11-08

## 2021-11-08 RX ORDER — ROPIVACAINE HYDROCHLORIDE 5 MG/ML
30 INJECTION, SOLUTION EPIDURAL; INFILTRATION; PERINEURAL ONCE
Status: COMPLETED | OUTPATIENT
Start: 2021-11-08 | End: 2021-11-08

## 2021-11-08 RX ORDER — ASPIRIN 81 MG/1
81 TABLET ORAL 2 TIMES DAILY
Status: DISCONTINUED | OUTPATIENT
Start: 2021-11-08 | End: 2021-11-09 | Stop reason: HOSPADM

## 2021-11-08 RX ORDER — MIDAZOLAM HYDROCHLORIDE 2 MG/2ML
1 INJECTION, SOLUTION INTRAMUSCULAR; INTRAVENOUS EVERY 5 MIN PRN
Status: DISCONTINUED | OUTPATIENT
Start: 2021-11-08 | End: 2021-11-08 | Stop reason: HOSPADM

## 2021-11-08 RX ORDER — MIDAZOLAM HYDROCHLORIDE 1 MG/ML
INJECTION INTRAMUSCULAR; INTRAVENOUS PRN
Status: DISCONTINUED | OUTPATIENT
Start: 2021-11-08 | End: 2021-11-08 | Stop reason: SDUPTHER

## 2021-11-08 RX ORDER — MIDAZOLAM HYDROCHLORIDE 1 MG/ML
INJECTION INTRAMUSCULAR; INTRAVENOUS PRN
Status: DISCONTINUED | OUTPATIENT
Start: 2021-11-08 | End: 2021-11-08

## 2021-11-08 RX ORDER — MELOXICAM 7.5 MG/1
7.5 TABLET ORAL ONCE
Status: COMPLETED | OUTPATIENT
Start: 2021-11-08 | End: 2021-11-08

## 2021-11-08 RX ORDER — DEXAMETHASONE SODIUM PHOSPHATE 4 MG/ML
INJECTION, SOLUTION INTRA-ARTICULAR; INTRALESIONAL; INTRAMUSCULAR; INTRAVENOUS; SOFT TISSUE PRN
Status: DISCONTINUED | OUTPATIENT
Start: 2021-11-08 | End: 2021-11-08 | Stop reason: SDUPTHER

## 2021-11-08 RX ORDER — PROPOFOL 10 MG/ML
INJECTION, EMULSION INTRAVENOUS CONTINUOUS PRN
Status: DISCONTINUED | OUTPATIENT
Start: 2021-11-08 | End: 2021-11-08 | Stop reason: SDUPTHER

## 2021-11-08 RX ORDER — PREGABALIN 75 MG/1
75 CAPSULE ORAL ONCE
Status: COMPLETED | OUTPATIENT
Start: 2021-11-08 | End: 2021-11-08

## 2021-11-08 RX ORDER — GABAPENTIN 400 MG/1
400 CAPSULE ORAL 3 TIMES DAILY
Status: DISCONTINUED | OUTPATIENT
Start: 2021-11-08 | End: 2021-11-09 | Stop reason: HOSPADM

## 2021-11-08 RX ORDER — SODIUM CHLORIDE 9 MG/ML
INJECTION, SOLUTION INTRAVENOUS CONTINUOUS
Status: DISCONTINUED | OUTPATIENT
Start: 2021-11-08 | End: 2021-11-08

## 2021-11-08 RX ORDER — BUPIVACAINE HYDROCHLORIDE 7.5 MG/ML
INJECTION, SOLUTION INTRASPINAL PRN
Status: DISCONTINUED | OUTPATIENT
Start: 2021-11-08 | End: 2021-11-08 | Stop reason: SDUPTHER

## 2021-11-08 RX ORDER — SODIUM CHLORIDE 0.9 % (FLUSH) 0.9 %
10 SYRINGE (ML) INJECTION EVERY 12 HOURS SCHEDULED
Status: DISCONTINUED | OUTPATIENT
Start: 2021-11-08 | End: 2021-11-09 | Stop reason: HOSPADM

## 2021-11-08 RX ORDER — SODIUM CHLORIDE 9 MG/ML
25 INJECTION, SOLUTION INTRAVENOUS PRN
Status: DISCONTINUED | OUTPATIENT
Start: 2021-11-08 | End: 2021-11-09 | Stop reason: HOSPADM

## 2021-11-08 RX ORDER — SODIUM CHLORIDE 9 MG/ML
25 INJECTION, SOLUTION INTRAVENOUS PRN
Status: DISCONTINUED | OUTPATIENT
Start: 2021-11-08 | End: 2021-11-08 | Stop reason: HOSPADM

## 2021-11-08 RX ORDER — LIDOCAINE HYDROCHLORIDE 10 MG/ML
5 INJECTION, SOLUTION EPIDURAL; INFILTRATION; INTRACAUDAL; PERINEURAL ONCE
Status: DISCONTINUED | OUTPATIENT
Start: 2021-11-08 | End: 2021-11-08 | Stop reason: HOSPADM

## 2021-11-08 RX ORDER — SODIUM CHLORIDE 9 MG/ML
INJECTION, SOLUTION INTRAVENOUS CONTINUOUS
Status: DISCONTINUED | OUTPATIENT
Start: 2021-11-08 | End: 2021-11-09 | Stop reason: HOSPADM

## 2021-11-08 RX ORDER — FENTANYL CITRATE 50 UG/ML
INJECTION, SOLUTION INTRAMUSCULAR; INTRAVENOUS PRN
Status: DISCONTINUED | OUTPATIENT
Start: 2021-11-08 | End: 2021-11-08 | Stop reason: SDUPTHER

## 2021-11-08 RX ADMIN — SODIUM CHLORIDE: 9 INJECTION, SOLUTION INTRAVENOUS at 12:11

## 2021-11-08 RX ADMIN — SODIUM CHLORIDE: 9 INJECTION, SOLUTION INTRAVENOUS at 11:12

## 2021-11-08 RX ADMIN — PREGABALIN 75 MG: 75 CAPSULE ORAL at 08:48

## 2021-11-08 RX ADMIN — TRANEXAMIC ACID 1000 MG: 1 INJECTION, SOLUTION INTRAVENOUS at 11:22

## 2021-11-08 RX ADMIN — ACETAMINOPHEN 1000 MG: 500 TABLET ORAL at 08:48

## 2021-11-08 RX ADMIN — SODIUM CHLORIDE, PRESERVATIVE FREE 10 ML: 5 INJECTION INTRAVENOUS at 20:47

## 2021-11-08 RX ADMIN — MIDAZOLAM HYDROCHLORIDE 2 MG: 1 INJECTION, SOLUTION INTRAMUSCULAR; INTRAVENOUS at 09:31

## 2021-11-08 RX ADMIN — ACETAMINOPHEN 650 MG: 325 TABLET ORAL at 15:21

## 2021-11-08 RX ADMIN — FENTANYL CITRATE 25 MCG: 50 INJECTION, SOLUTION INTRAMUSCULAR; INTRAVENOUS at 11:21

## 2021-11-08 RX ADMIN — MELOXICAM 7.5 MG: 7.5 TABLET ORAL at 08:48

## 2021-11-08 RX ADMIN — KETAMINE HYDROCHLORIDE 30 MG: 10 INJECTION INTRAMUSCULAR; INTRAVENOUS at 11:50

## 2021-11-08 RX ADMIN — FUROSEMIDE 20 MG: 20 TABLET ORAL at 15:20

## 2021-11-08 RX ADMIN — GABAPENTIN 400 MG: 400 CAPSULE ORAL at 20:45

## 2021-11-08 RX ADMIN — Medication 2000 MG: at 19:16

## 2021-11-08 RX ADMIN — Medication 2000 MG: at 11:12

## 2021-11-08 RX ADMIN — DEXAMETHASONE SODIUM PHOSPHATE 8 MG: 4 INJECTION, SOLUTION INTRAMUSCULAR; INTRAVENOUS at 11:26

## 2021-11-08 RX ADMIN — GABAPENTIN 400 MG: 400 CAPSULE ORAL at 15:20

## 2021-11-08 RX ADMIN — MIDAZOLAM 1 MG: 1 INJECTION INTRAMUSCULAR; INTRAVENOUS at 11:33

## 2021-11-08 RX ADMIN — ASPIRIN 81 MG: 81 TABLET, COATED ORAL at 20:45

## 2021-11-08 RX ADMIN — FENTANYL CITRATE 50 MCG: 0.05 INJECTION, SOLUTION INTRAMUSCULAR; INTRAVENOUS at 09:31

## 2021-11-08 RX ADMIN — SODIUM CHLORIDE: 9 INJECTION, SOLUTION INTRAVENOUS at 15:21

## 2021-11-08 RX ADMIN — TAMSULOSIN HYDROCHLORIDE 0.4 MG: 0.4 CAPSULE ORAL at 15:20

## 2021-11-08 RX ADMIN — BUPIVACAINE HYDROCHLORIDE IN DEXTROSE 1.8 ML: 7.5 INJECTION, SOLUTION SUBARACHNOID at 11:21

## 2021-11-08 RX ADMIN — MIDAZOLAM 1 MG: 1 INJECTION INTRAMUSCULAR; INTRAVENOUS at 11:25

## 2021-11-08 RX ADMIN — PROPOFOL 75 MCG/KG/MIN: 10 INJECTION, EMULSION INTRAVENOUS at 11:25

## 2021-11-08 RX ADMIN — ROPIVACAINE HYDROCHLORIDE 45 ML: 5 INJECTION, SOLUTION EPIDURAL; INFILTRATION; PERINEURAL at 09:39

## 2021-11-08 RX ADMIN — KETAMINE HYDROCHLORIDE 20 MG: 10 INJECTION INTRAMUSCULAR; INTRAVENOUS at 11:25

## 2021-11-08 RX ADMIN — ACETAMINOPHEN 650 MG: 325 TABLET ORAL at 20:45

## 2021-11-08 ASSESSMENT — PAIN DESCRIPTION - LOCATION
LOCATION: KNEE;LEG
LOCATION: KNEE;LEG

## 2021-11-08 ASSESSMENT — PULMONARY FUNCTION TESTS
PIF_VALUE: 1
PIF_VALUE: 0
PIF_VALUE: 1
PIF_VALUE: 0
PIF_VALUE: 1
PIF_VALUE: 0
PIF_VALUE: 1
PIF_VALUE: 0
PIF_VALUE: 1
PIF_VALUE: 0
PIF_VALUE: 1
PIF_VALUE: 0
PIF_VALUE: 1
PIF_VALUE: 0
PIF_VALUE: 1
PIF_VALUE: 0
PIF_VALUE: 1
PIF_VALUE: 0
PIF_VALUE: 1

## 2021-11-08 ASSESSMENT — PAIN SCALES - GENERAL
PAINLEVEL_OUTOF10: 0
PAINLEVEL_OUTOF10: 0
PAINLEVEL_OUTOF10: 3
PAINLEVEL_OUTOF10: 0

## 2021-11-08 ASSESSMENT — PAIN DESCRIPTION - DESCRIPTORS
DESCRIPTORS: ACHING;DISCOMFORT
DESCRIPTORS: DISCOMFORT;TENDER;DULL
DESCRIPTORS: DISCOMFORT;DULL;TENDER
DESCRIPTORS: ACHING;DISCOMFORT

## 2021-11-08 ASSESSMENT — PAIN DESCRIPTION - PROGRESSION
CLINICAL_PROGRESSION: GRADUALLY WORSENING
CLINICAL_PROGRESSION: NOT CHANGED

## 2021-11-08 ASSESSMENT — PAIN - FUNCTIONAL ASSESSMENT
PAIN_FUNCTIONAL_ASSESSMENT: PREVENTS OR INTERFERES SOME ACTIVE ACTIVITIES AND ADLS
PAIN_FUNCTIONAL_ASSESSMENT: PREVENTS OR INTERFERES SOME ACTIVE ACTIVITIES AND ADLS
PAIN_FUNCTIONAL_ASSESSMENT: 0-10

## 2021-11-08 ASSESSMENT — PAIN DESCRIPTION - PAIN TYPE
TYPE: ACUTE PAIN
TYPE: ACUTE PAIN

## 2021-11-08 ASSESSMENT — PAIN DESCRIPTION - ORIENTATION
ORIENTATION: LEFT
ORIENTATION: LEFT

## 2021-11-08 ASSESSMENT — PAIN DESCRIPTION - FREQUENCY
FREQUENCY: INTERMITTENT
FREQUENCY: INTERMITTENT

## 2021-11-08 ASSESSMENT — PAIN DESCRIPTION - ONSET
ONSET: GRADUAL
ONSET: GRADUAL

## 2021-11-08 NOTE — PROGRESS NOTES
Left adductor canal and left IPACK block completed per Dr Beatrice Mathew with good toleration. Call light within reach. Family called to bedside.

## 2021-11-08 NOTE — CARE COORDINATION
Met with patient about diagnosis and discharge plan of care. Pt post op left TKA. Pt lives with spouse in 2 story home, bed and bath on 1st floor. Pt has all DME. Plan is home with [facility] home care-notified.  Will follow-mjo

## 2021-11-08 NOTE — ANESTHESIA PROCEDURE NOTES
Peripheral Block    Patient location during procedure: PACU  Staffing  Performed: anesthesiologist   Anesthesiologist: Nayla Gamboa MD  Preanesthetic Checklist  Completed: patient identified, IV checked, site marked, risks and benefits discussed, surgical consent, monitors and equipment checked, pre-op evaluation, timeout performed, anesthesia consent given, oxygen available and patient being monitored  Peripheral Block  Patient position: supine  Prep: ChloraPrep  Patient monitoring: cardiac monitor, continuous pulse ox, frequent blood pressure checks and IV access  Block type: Saphenous  Laterality: left  Injection technique: single-shot  Guidance: ultrasound guided  Local infiltration: ropivacaine  Infiltration strength: 0.5 %  Dose: 30 mL  Provider prep: mask and sterile gloves  Local infiltration: ropivacaine  Needle  Needle type: combined needle/nerve stimulator   Needle gauge: 20 G  Needle length: 10 cm  Needle localization: ultrasound guidance  Assessment  Injection assessment: negative aspiration for heme, no paresthesia on injection and local visualized surrounding nerve on ultrasound  Slow fractionated injection: yes  Hemodynamics: stable  Additional Notes  U/S 56913.  (1) Under ultrasound guidance, a 20 gauge needle was inserted and placed in close proximity to the left saphenous nerve.  (2) Ultrasound was also used to visualize the spread of the anesthetic in close proximity to the nerve being blocked. (3) The nerve appeared anatomically normal, and (4 there were no apparent abnormal pathological findings on the image that were readily visible and related to the nerve being blocked. (5) A permanent ultrasound image was saved in the patient's record.       Reason for block: post-op pain management and at surgeon's request

## 2021-11-08 NOTE — ANESTHESIA PROCEDURE NOTES
Spinal Block    Patient location during procedure: OR  Start time: 11/8/2021 11:15 AM  End time: 11/8/2021 11:21 AM  Reason for block: primary anesthetic and at surgeon's request  Staffing  Anesthesiologist: Mark Guillen MD  Resident/CRNA: ANA Cameron CRNA  Preanesthetic Checklist  Completed: patient identified, IV checked, site marked, risks and benefits discussed, surgical consent, monitors and equipment checked, pre-op evaluation, timeout performed, anesthesia consent given, oxygen available and patient being monitored  Spinal Block  Patient position: sitting  Prep: ChloraPrep  Patient monitoring: cardiac monitor, continuous pulse ox, continuous capnometry and frequent blood pressure checks  Approach: midline  Location: L2/L3  Provider prep: mask, sterile gloves and sterile gown  Local infiltration: lidocaine  Dose: 0.5  Agent: bupivacaine  Adjuvant: fentanyl  Dose: 1.8  Dose: 1.8  Needle  Needle type: Pencan   Needle gauge: 25 G  Needle length: 3.5 in  Assessment  Sensory level: T6  Swirl obtained: Yes  CSF: clear  Attempts: 1  Hemodynamics: stable

## 2021-11-08 NOTE — CONSULTS
[address] [facility]      Reason for Consult:  Medical management    History of Present Illness:  63 y.o. male with a history of BPH, cervical surgery with chronic neuropathy of the hands and severe osteoarthritis of the left knee. Patient is now just status post left total knee arthroplasty. Patient has absolutely no complaints at this time including no knee pain. Review of systems is negative for fever or chills, chest pain or palpitations, shortness of breath, cough or URI type symptoms, abdominal pain, nausea vomiting or diarrhea. Patient states he is at his baseline. We were consulted for medical management. Informant(s) for H&P: Patient    REVIEW OF SYSTEMS:  Complete ROS performed with patient, pertinent positives and negatives are listed in the HPI. PMH:  Past Medical History:   Diagnosis Date    Hyperlipidemia     Hypertension     Knee pain     left For OR 11-8-21    Leaky heart valve        Surgical History:  Past Surgical History:   Procedure Laterality Date    BACK SURGERY  2008    disc    CERVICAL FUSION  2016    HAND SURGERY Right 02/04/2015    right hand debridment second proximal phalanx    TOTAL KNEE ARTHROPLASTY Left 11/8/2021    ROBOTIC ASSISTED KIRK LEFT KNEE TOTAL ARTHROPLASTY   PNB performed by Izaiah Bains MD at [facility] OR       Medications Prior to Admission:    Prior to Admission medications    Medication Sig Start Date End Date Taking?  Authorizing Provider   naproxen (NAPROSYN) 250 MG tablet Take 250 mg by mouth 2 times daily (with meals)   Yes Historical Provider, MD   furosemide (LASIX) 20 MG tablet TAKE ONE TABLET BY MOUTH EVERY DAY 10/9/21  Yes Historical Provider, MD   Multiple Vitamins-Minerals (PRESERVISION AREDS) CAPS Take by mouth daily    Yes Historical Provider, MD   tamsulosin (FLOMAX) 0.4 MG capsule tamsulosin 0.4 mg capsule   TAKE ONE CAPSULE BY MOUTH EVERY DAY   Yes Historical Provider, MD   gabapentin (NEURONTIN) 400 MG capsule Take 400 mg by mouth 3 times daily. Yes Historical Provider, MD       Allergies:    Patient has no known allergies. Social History:    reports that he has been smoking. He has a 80.00 pack-year smoking history. He has never used smokeless tobacco. He reports current alcohol use. He reports that he does not use drugs. Family History:   History reviewed. No pertinent family history. PHYSICAL EXAM:  Vitals:  /87   Pulse 84   Temp 97.1 °F (36.2 °C) (Oral)   Resp 16   Ht 5' 6\" (1.676 m)   Wt 199 lb (90.3 kg)   SpO2 100%   BMI 32.12 kg/m²   General Appearance: alert and oriented to person, place and time, well developed and well- nourished, in no acute distress  Skin: warm and dry, no rash or erythema  Head: normocephalic and atraumatic  Eyes: pupils equal, round, and reactive to light, extraocular eye movements intact, conjunctivae normal  ENT: mouth and throat clear  Neck: supple and non-tender   Pulmonary/Chest: clear to auscultation bilaterally  Cardiovascular: regular, 1+ systolic murmur  Abdomen: soft, non-tender, non-distended  Extremities: 1+ edema, left knee in ace wrap  Musculoskeletal: normal range of motion, no joint swelling, deformity or tenderness  Neurologic: reflexes normal and symmetric, no cranial nerve deficit, gait, coordination and speech normal    LABS:  No results for input(s): NA, K, CL, CO2, BUN, CREATININE, GLUCOSE, CALCIUM in the last 72 hours. No results for input(s): WBC, RBC, HGB, HCT, MCV, MCH, MCHC, RDW, PLT, MPV in the last 72 hours. No results for input(s): POCGLU in the last 72 hours. Radiology:   No orders to display       EKG:   No ECG seen    ASSESSMENT:      Active Problems:    S/P total knee arthroplasty, left    Primary osteoarthritis of left knee  Resolved Problems:    * No resolved hospital problems. *      PLAN:    1.   Osteoarthritis left knee  Postop day 0 left total knee arthroplasty. Patient quite medically stable at this time. Pain control and DVT prophylaxis per Ortho service. He is on aspirin 81 mg twice a day. 2.  BPH  Continue Flomax. 3.  Chronic upper extremity neuropathy  Continue Lyrica and Neurontin. Patient is on Lasix that he says he takes for chronic lower extremity edema only. I will hold this overnight and follow-up BMP. If no issues patient can restart this on discharge. Will continue to follow along. Feel free to call with any questions at [phone]. Electronically signed by Amber Ryan MD on 11/8/2021 at 5:21 PM    NOTE: This report was transcribed using voice recognition software.  Every effort was made to ensure accuracy; however, inadvertent computerized transcription errors may be present.

## 2021-11-08 NOTE — INTERVAL H&P NOTE
Update History & Physical    The patient's History and Physical of November 5, 2021 was reviewed with the patient and I examined the patient. There was no change. The surgical site was confirmed by the patient and me. Plan: The risks, benefits, expected outcome, and alternative to the recommended procedure have been discussed with the patient. Patient understands and wants to proceed with the procedure.      Electronically signed by Mima Pena MD on 11/8/2021 at 8:32 AM

## 2021-11-08 NOTE — OP NOTE
Operative Note      Patient: Theresa Levy  YOB: 1960  MRN: [id number]    Date of Procedure: 11/8/2021    Pre-Op Diagnosis: LEFT Knee DEGENERATIVE JOINT DISEASE    Post-Op Diagnosis: Same       Procedure(s):  ROBOTIC ASSISTED KIRK LEFT KNEE TOTAL ARTHROPLASTY   PNB    Surgeon(s):  Katherine Desai MD    Assistant:   Resident: Pam Gilliam DO    Anesthesia: General    Estimated Blood Loss (mL): Minimal    Complications: None    Specimens:   ID Type Source Tests Collected by Time Destination   A : LEFT KNEE BONE Bone Bone SURGICAL PATHOLOGY Katherine Desai MD 11/8/2021 7615        Implants:  Implant Name Type Inv. Item Serial No.  Lot No. LRB No. Used Action   CEMENT BNE 40 GM RADIOPAQUE BA SIMPLEX P  CEMENT BNE 40 GM RADIOPAQUE BA SIMPLEX P  [employer] [facility] [id number] Left 1 Implanted   COMPONENT FEM SZ 5 L ANT KNEE CRUCE RET PINA REFERENCING  COMPONENT FEM SZ 5 L ANT KNEE CRUCE RET PINA REFERENCING  [employer] [facility] [id number] Left 1 Implanted   INSERT TIB BEAR SZ 6 THK9MM KNEE X3 CRUCE RET TRIATHLON  INSERT TIB BEAR SZ 6 THK9MM KNEE X3 CRUCE RET TRIATHLON  [employer] [id number] Left 1 Implanted   BASEPLATE TIB SZ 6 KNEE TRITANIUM PINA ALIYA LO PROF TRIATHLON  BASEPLATE TIB SZ 6 KNEE TRITANIUM PINA ALIYA LO PROF TRIATHLON  [employer] [facility] [id number] Left 1 Implanted           OPERATIVE INDICATIONS:  The patient is a 63year old male with end stage degenerative joint disease involving the knee, for which more conservative non operative management has failed. The patient is thus indicated for total knee arthroplasty. We discussed use of the [facility] robot for assistance. The patient was agreeable. The risks and benefits, as well as alternatives were discussed at length with the patient in detail.   These risks include, but are not limited to infection, nerve and/or blood vessel injury, intra or post operative fracture, need for revision surgery, failure of implants, deep vein thrombosis and pulmonary embolism, athrofibrosis, and the risks of general anesthesia provided by the anesthesiologist.   The patient understood these risks, signed an informed consent, and elected to proceed    OPERATIVE FINDINGS:   There was extensive eburnation of bone, and full thickness cartilage loss over the femoral and tibial condyles. OPERATIVE PROCEDURE: After satisfactory spinal anesthesia, the patient was placed supine on the operative table. A tourniquet was placed high on the operative thigh. The operative extremity was prepped and draped in sterile fashion. Prior to procedure start, a time out was performed including confirmation of operative site and operation to be performed. Antibiotic prophylaxis, 2 g ancef was given prior to incision, as was 1 g of transexamic acid. The leg was exsanguinated with an Esmarch bandage. The tourniquet was inflated. An anterior midline incision was made centered about the patella. Dissection was carried down in the midline of the extensor mechanism where a medial parapatellar arthrotomy was made. We then placed our femoral and tibial pins and assembled the array for the femur and tibia respectively. Femoral and tibial registration buttons were placed. Hip centering and identification of medial and lateral malleoli was performed. The knee was flexed. Appropriate points were registered on the femur followed by the tibia. All osteophytes were then removed. The knee was brought into extension. Appropriate tension was placed on the medial and lateral compartments with sizing spoons and we captured our measurements with correction of deformity. This was repeated with the knee in 90 degrees of flexion. Appropriate adjustments were then made utilizing the [address] to plan for 19 mm gaps in extension and flexion. We then prepared to make our cuts. The knee was flexed. Medial and lateral retractors were placed. Utilizing the [facility] robot arm, we made femoral and tibial cuts. All bone fragments were removed. Posterior osteophytes and remaining meniscal tissue was removed. The knee was then flexed, and the appropriate size tibial tray was placed in the correct amount of external rotation. A size 6 proved to be best fit. This as pinned into place. The femoral trial was then placed, size 5, a 9mm trial polyethylene was placed on the tibial component, and the knee was reduced and taken through a range of motion. Range of motion was found to be excellent including 0 degrees at extension, and 140 degrees of flexion without tibial tray lift off. We noted that we were balanced nicely in extension and flexion based on the measurements on our CT image, 19 mm gaps in flexion and extension. The knee was then flexed again, and the femur was drilled. The tibial tower was placed and the tibia was punched. All trial components were then removed. As well as Isidro pins and check points. The knee was thoroughly irrigated. With the knee in extension, bovie electrocautery was utilized to coagulate in the posteromedial and posterolateral gutters. Local anesthetic consisting of 1% lidocaine with epi, 1% lidocaine without epi, and 0.25% Marcaine was then placed into the capsule and into the periosteum of the femur and tibia, and into the anterior capsule. 1 unit of  cement was mixed on the back table. The bony surfaces were dried and the knee was flexed. Cement was placed via gun onto the tibial the components and The tibial component was then placed using a mallet. The final polyethylene was impacted into place. Cement was then placed along the femoral component surface. The femoral component was then placed. Excess cement was removed and the components were impacted once again. Once the cement hardened, the clamp was removed and the knee was irrigated.   A Betadine shock was performed for the appropriate amount of time.  We assessed range of motion and stability once again and noted full extension, flexion 140 degrees without significant tightness in good posterior drawer, as well as symmetric gaps on our CT model. Irrigated once more. The joint capsule was then closed using an O stratafix and interrupted 1 ethobond. 2-O interrupted vicryl suture was used to close the subcutanseous tissue. Finally, a 4-O running subcuticular monocryl suture was used to closed skin. Dermabond was then placed over the incision. A sterile dressing was placed. and tourniquet was let down. The patient was then transferred to their hospital bed, awoken from anesthesia, and transported to the PACU in stable condition. POST OPERATIVE PLAN: The patient will be transferred to the orthopaedic floor from PACU once stable. Post operative antibiotics will be continued for 24 hours. Physical therapy will begin immediately, with weight bearing as tolerated. DVT prophylaxis will be with SCD's starting today, and  mg BID starting tomorrow. It should be noted that Dr. Gabrielle Castellon was present for the entirety of the procedure.          Electronically signed by Brittnee Cordova DO on 11/8/2021 at 1:31 PM

## 2021-11-08 NOTE — ANESTHESIA PROCEDURE NOTES
Peripheral Block    Patient location during procedure: PACU  Staffing  Performed: anesthesiologist   Anesthesiologist: Farzana Palacios MD  Preanesthetic Checklist  Completed: patient identified, IV checked, site marked, risks and benefits discussed, surgical consent, monitors and equipment checked, pre-op evaluation, timeout performed, anesthesia consent given, oxygen available and patient being monitored  Peripheral Block  Patient position: supine  Prep: ChloraPrep  Patient monitoring: cardiac monitor, continuous pulse ox, frequent blood pressure checks and IV access  Block type: iPacks  Laterality: left  Injection technique: single-shot  Guidance: ultrasound guided  Local infiltration: ropivacaine  Infiltration strength: 0.5 %  Dose: 15 mL  Provider prep: mask and sterile gloves  Local infiltration: ropivacaine  Needle  Needle type: combined needle/nerve stimulator   Needle gauge: 20 G  Needle length: 10 cm  Needle localization: ultrasound guidance  Assessment  Injection assessment: negative aspiration for heme, no paresthesia on injection and local visualized surrounding nerve on ultrasound  Slow fractionated injection: yes  Hemodynamics: stable  Additional Notes  U/S 16305.  (1) Under ultrasound guidance, a 20 gauge needle was inserted and placed in close proximity to the left sciatic nerve.  (2) Ultrasound was also used to visualize the spread of the anesthetic in close proximity to the nerve being blocked. (3) The nerve appeared anatomically normal, and (4 there were no apparent abnormal pathological findings on the image that were readily visible and related to the nerve being blocked. (5) A permanent ultrasound image was saved in the patient's record.       Reason for block: post-op pain management and at surgeon's request

## 2021-11-08 NOTE — ANESTHESIA POSTPROCEDURE EVALUATION
Department of Anesthesiology  Postprocedure Note    Patient: Monique Bal  MRN: [id number]  YOB: 1960  Date of evaluation: 11/8/2021  Time:  4:08 PM     Procedure Summary     Date: 11/08/21 Room / Location: [facility] OR 04 / [facility]    Anesthesia Start: 1109 Anesthesia Stop: 1192    Procedure: ROBOTIC ASSISTED KIRK LEFT KNEE TOTAL ARTHROPLASTY   PNB (Left Knee) Diagnosis: (LEFT DEGENERATIVE JOINT DISEASE)    Surgeons: Rashid Shelton MD Responsible Provider: Kelly Delgado MD    Anesthesia Type: general, spinal ASA Status: 3          Anesthesia Type: general, spinal    Tessie Phase I: Tessie Score: 10    Tessie Phase II:      Last vitals: Reviewed and per EMR flowsheets.        Anesthesia Post Evaluation    Patient location during evaluation: PACU  Patient participation: complete - patient participated  Level of consciousness: awake and alert  Airway patency: patent  Nausea & Vomiting: no nausea  Complications: no  Cardiovascular status: hemodynamically stable  Respiratory status: acceptable  Hydration status: stable

## 2021-11-09 VITALS
DIASTOLIC BLOOD PRESSURE: 80 MMHG | OXYGEN SATURATION: 93 % | SYSTOLIC BLOOD PRESSURE: 122 MMHG | WEIGHT: 199 LBS | TEMPERATURE: 98.6 F | BODY MASS INDEX: 31.98 KG/M2 | RESPIRATION RATE: 16 BRPM | HEART RATE: 66 BPM | HEIGHT: 66 IN

## 2021-11-09 LAB
ANION GAP SERPL CALCULATED.3IONS-SCNC: 13 MMOL/L (ref 7–16)
BUN BLDV-MCNC: 10 MG/DL (ref 6–20)
CALCIUM SERPL-MCNC: 9 MG/DL (ref 8.6–10.2)
CHLORIDE BLD-SCNC: 103 MMOL/L (ref 98–107)
CO2: 22 MMOL/L (ref 22–29)
CREAT SERPL-MCNC: 0.8 MG/DL (ref 0.7–1.2)
GFR AFRICAN AMERICAN: >60
GFR NON-AFRICAN AMERICAN: >60 ML/MIN/1.73
GLUCOSE BLD-MCNC: 194 MG/DL (ref 74–99)
HCT VFR BLD CALC: 43.7 % (ref 37–54)
HEMOGLOBIN: 14.9 G/DL (ref 12.5–16.5)
POTASSIUM REFLEX MAGNESIUM: 4 MMOL/L (ref 3.5–5)
SODIUM BLD-SCNC: 138 MMOL/L (ref 132–146)

## 2021-11-09 PROCEDURE — G0378 HOSPITAL OBSERVATION PER HR: HCPCS

## 2021-11-09 PROCEDURE — 97530 THERAPEUTIC ACTIVITIES: CPT

## 2021-11-09 PROCEDURE — 97161 PT EVAL LOW COMPLEX 20 MIN: CPT

## 2021-11-09 PROCEDURE — 97165 OT EVAL LOW COMPLEX 30 MIN: CPT

## 2021-11-09 PROCEDURE — 80048 BASIC METABOLIC PNL TOTAL CA: CPT

## 2021-11-09 PROCEDURE — 85014 HEMATOCRIT: CPT

## 2021-11-09 PROCEDURE — 85018 HEMOGLOBIN: CPT

## 2021-11-09 PROCEDURE — 97110 THERAPEUTIC EXERCISES: CPT

## 2021-11-09 PROCEDURE — 6360000002 HC RX W HCPCS: Performed by: ORTHOPAEDIC SURGERY

## 2021-11-09 PROCEDURE — 36415 COLL VENOUS BLD VENIPUNCTURE: CPT

## 2021-11-09 PROCEDURE — 2580000003 HC RX 258: Performed by: ORTHOPAEDIC SURGERY

## 2021-11-09 PROCEDURE — 6370000000 HC RX 637 (ALT 250 FOR IP): Performed by: ORTHOPAEDIC SURGERY

## 2021-11-09 PROCEDURE — 99024 POSTOP FOLLOW-UP VISIT: CPT | Performed by: ORTHOPAEDIC SURGERY

## 2021-11-09 RX ORDER — ASPIRIN 81 MG/1
81 TABLET ORAL 2 TIMES DAILY
Qty: 60 TABLET | Refills: 0 | Status: SHIPPED | OUTPATIENT
Start: 2021-11-09

## 2021-11-09 RX ORDER — OXYCODONE HYDROCHLORIDE AND ACETAMINOPHEN 5; 325 MG/1; MG/1
1 TABLET ORAL EVERY 6 HOURS PRN
Qty: 28 TABLET | Refills: 0 | Status: SHIPPED | OUTPATIENT
Start: 2021-11-09 | End: 2021-11-16

## 2021-11-09 RX ADMIN — ACETAMINOPHEN 650 MG: 325 TABLET ORAL at 03:17

## 2021-11-09 RX ADMIN — GABAPENTIN 400 MG: 400 CAPSULE ORAL at 08:20

## 2021-11-09 RX ADMIN — OXYCODONE 5 MG: 5 TABLET ORAL at 03:27

## 2021-11-09 RX ADMIN — ASPIRIN 81 MG: 81 TABLET, COATED ORAL at 08:19

## 2021-11-09 RX ADMIN — OXYCODONE 5 MG: 5 TABLET ORAL at 08:20

## 2021-11-09 RX ADMIN — SODIUM CHLORIDE, PRESERVATIVE FREE 10 ML: 5 INJECTION INTRAVENOUS at 08:20

## 2021-11-09 RX ADMIN — Medication 2000 MG: at 03:17

## 2021-11-09 RX ADMIN — TAMSULOSIN HYDROCHLORIDE 0.4 MG: 0.4 CAPSULE ORAL at 08:20

## 2021-11-09 RX ADMIN — ACETAMINOPHEN 650 MG: 325 TABLET ORAL at 08:20

## 2021-11-09 ASSESSMENT — PAIN DESCRIPTION - ONSET
ONSET: GRADUAL

## 2021-11-09 ASSESSMENT — PAIN DESCRIPTION - PROGRESSION
CLINICAL_PROGRESSION: GRADUALLY WORSENING
CLINICAL_PROGRESSION: RAPIDLY IMPROVING

## 2021-11-09 ASSESSMENT — PAIN SCALES - GENERAL
PAINLEVEL_OUTOF10: 0
PAINLEVEL_OUTOF10: 9
PAINLEVEL_OUTOF10: 7
PAINLEVEL_OUTOF10: 9
PAINLEVEL_OUTOF10: 8

## 2021-11-09 ASSESSMENT — PAIN DESCRIPTION - LOCATION
LOCATION: KNEE

## 2021-11-09 ASSESSMENT — PAIN DESCRIPTION - FREQUENCY
FREQUENCY: CONTINUOUS

## 2021-11-09 ASSESSMENT — PAIN DESCRIPTION - DESCRIPTORS
DESCRIPTORS: DISCOMFORT;DULL
DESCRIPTORS: DULL
DESCRIPTORS: DISCOMFORT;DULL;TENDER

## 2021-11-09 ASSESSMENT — PAIN DESCRIPTION - PAIN TYPE
TYPE: ACUTE PAIN

## 2021-11-09 ASSESSMENT — PAIN DESCRIPTION - ORIENTATION
ORIENTATION: LEFT

## 2021-11-09 ASSESSMENT — PAIN - FUNCTIONAL ASSESSMENT
PAIN_FUNCTIONAL_ASSESSMENT: ACTIVITIES ARE NOT PREVENTED
PAIN_FUNCTIONAL_ASSESSMENT: PREVENTS OR INTERFERES SOME ACTIVE ACTIVITIES AND ADLS

## 2021-11-09 NOTE — PROGRESS NOTES
CLINICAL PHARMACY NOTE: MEDS TO BEDS    Total # of Prescriptions Filled: 1   The following medications were delivered to the patient:  · Percocet 5/325 mg     Additional Documentation:

## 2021-11-09 NOTE — PROGRESS NOTES
Occupational Therapy  OCCUPATIONAL THERAPY INITIAL EVALUATION     Arielle Trujillo [address]        ZEBQ:                                                  Patient Name: Vinicio Herrmann    MRN: [id number]    : 1960    Room: [address] [place] [address]      Evaluating OT: Abraham Galvan, OTR/L [id number]      Referring Provider: Shannon Gaytan MD    Specific Provider Orders/Date: OT eval and treat 21      Diagnosis: S/P total knee arthroplasty, left [Z96.652]     Surgery: L TKA 21     Pertinent Medical History: hyperlipidemia, HTN, leaky heart valve, back surgery , cervical fusion         Precautions:  Fall Risk, LLE FWBAT     Assessment of current deficits    [x] Functional mobility  [x]ADLs  [x] Strength               []Cognition    [x] Functional transfers   [x] IADLs         [x] Safety Awareness   [x]Endurance    [] Fine Coordination              [x] Balance      [] Vision/perception   [x]Sensation     []Gross Motor Coordination  [] ROM  [] Delirium                   [] Motor Control     OT PLAN OF CARE   OT POC based on physician orders, patient diagnosis and results of clinical assessment    Frequency/Duration 2-5 days/wk for 2 weeks PRN   Specific OT Treatment Interventions to include:   * Instruction/training on adapted ADL techniques and AE recommendations to increase functional independence within precautions       * Training on energy conservation strategies, correct breathing pattern and techniques to improve independence/tolerance for self-care routine  * Functional transfer/mobility training/DME recommendations for increased independence, safety, and fall prevention  * Patient/Family education to increase follow through with safety techniques and functional independence  * Recommendation of environmental modifications for increased safety with functional transfers/mobility and ADLs  * Therapeutic exercise to improve motor endurance, ROM, and functional strength for ADLs/functional transfers  * Therapeutic activities to facilitate/challenge dynamic balance, stand tolerance for increased safety and independence with ADLs        Recommended Adaptive Equipment: shower chair     Home Living: Pt lives with wife in 2 story home with 1 small JOE . Pt's bedroom and bathroom are on the 1st floor. Bathroom setup: tub/shower, grab bars, shower chair (pt reports that it is broken and falling apart)   Equipment owned: cane, sock aid    Prior Level of Function: independent with ADLs , independent with IADLs; functional mobility: cane PRN    Pain Level: Pt reported pain in LLE but did not rate. Pt stated that he just recently received pain pill  Cognition: A&O: 4/4; WFL command follow demonstrated. Pt was pleasant, talkative, and motivated    Memory:  good   Sequencing:  good   Problem solving:  good   Judgement/safety:  good     Functional Assessment:  AM-PAC Daily Activity Raw Score: 20/24   Initial Eval Status  Date: 11/9/21 Treatment Status  Date: STGs = LTGs  Time frame: 10-14 days   Feeding I     Grooming Sup to stand at sink for hand hygiene  I   UB Dressing Sup/Set up  I   LB Dressing Min A to adjust sock seated in chair   Mod I-with use of AD as appropriate/needed   Bathing Min A  Mod I -with use of AD as appropriate/needed   Toileting Sup  I    Bed Mobility  Pt in chair     Functional Transfers SBA with wheeled walker  Sit<>stand from chair  Sit<>stand from commode  Simulated tub transfer  I    Functional Mobility SBA/Sup with wheeled walker to and from bathroom  I -with device as needed to maximize independence with ADLs and functional task completion   Balance Sitting:     Static:  I    Dynamic:I  Standing: Sup  I for standing balance to maximize independence with ADLs and functional task completion   Activity Tolerance Good with light activity  good with ADL activity.  Pt will demonstrate good understanding of education provided on EC/WS techniques    Visual/  Perceptual Glasses: yes                Additional long-term goal: Pt will increase functional independence to PLOF to allow pt to live in least restrictive environment. Hand Dominance R   AROM (PROM) Strength Additional Info:    RUE  WFL WFL good  and wfl FMC/dexterity noted during ADL tasks       LUE WFL WFL good  and wfl FMC/dexterity noted during ADL tasks       [facility] [place]   Sensation:  Pt reported numbness and tingling in B hands and LLE  Tone: WFL   Edema: none noted    Comments: Upon arrival patient sitting in chair. At end of session, patient returned to [facility] with call light and phone within reach, all lines and tubes intact. Overall patient demonstrated decreased independence and safety during completion of ADL/functional transfer/mobility tasks. Pt would benefit from continued skilled OT to increase safety and independence with completion of ADL/IADL tasks for functional independence and quality of life. Treatment: OT treatment provided this date includes:    Instruction/training on safety and adapted techniques for completion of ADLs: Pt was already dressed upon arrival, so educated on proper LB dressing technique and sequencing. Pt verbalized good understanding. Pt Min A to adjust socks seated in chair. Pt educated on the use of reacher and sock aid if needed to assist in LB dressing tasks. Pt verbalized good understanding and reported that he owns a sock aid.   Instruction/training on safe functional mobility/transfer techniques: Pt stood from chair with Sup and walk to bathroom with wheeled walker and Sup to improve independence and safety with ADLs and functional tasks. Pt sat to commode with Sup and cues for hand placement. Pt Sup to stand from commode with use of grab bar and washed hands at sink with Sup. Pt educated on technique for transferring into tub and practice simulated tub transfer with SBA cues for hand placement and safety. Rehab Potential: Good for established goals     Patient / Family Goal: to return home    Patient and/or family were instructed on functional diagnosis, prognosis/goals and OT plan of care. Demonstrated good understanding. Eval Complexity: Low    Time In: 0916  Time Out: 0943  Total Treatment Time: 12    Min Units   OT Eval Low 97165  x  1   OT Eval Medium 42956      OT Eval High 95922      OT Re-Eval [id number]       Therapeutic Ex 65561       Therapeutic Activities 40484  12  1   ADL/Self Care 41166       Orthotic Management 53976       Manual 47520     Neuro Re-Ed 31396       Non-Billable Time          Evaluation Time additionally includes thorough review of current medical information, gathering information on past medical history/social history and prior level of function, interpretation of standardized testing/informal observation of tasks, assessment of data and development of plan of care and goals.             Saba Goldberg, OTR/L, [id number]

## 2021-11-09 NOTE — PROGRESS NOTES
Physical Therapy    Facility/Department: [facility] SURGERY  Initial Assessment    NAME: Saint Pellet Rininger  : 1960  MRN: [id number]    Date of Service: 2021    Patient Diagnosis(es): The primary encounter diagnosis was Preop testing. A diagnosis of S/P total knee arthroplasty, left was also pertinent to this visit. has a past medical history of Hyperlipidemia, Hypertension, Knee pain, and Leaky heart valve. has a past surgical history that includes Hand surgery (Right, 2015); back surgery (); cervical fusion (); and Total knee arthroplasty (Left, 2021). Referring provider:  Earlene Enriquez MD    PT Order:  PT eval and treat     Evaluating PT:  Dominic Gomez PT, DPT PT [id number]    Room #:  7542/5088-W  Diagnosis:  S/P total knee arthroplasty, left [Z96.652]  Procedure/Surgery:  Left TKA  Precautions:  Fall risk, WBAT left LE  Equipment Needs:  Dulcie Sicks. Pt reported owning a cane. SUBJECTIVE:    Pt lives with his wife in a 2 story home with 0 stairs to enter and 0 rail. Bed and bath is on first floor. Pt ambulated with cane as needed PTA. OBJECTIVE:   Initial Evaluation  Date:  Treatment Short Term/ Long Term   Goals   Was pt agreeable to Eval/treatment? yes     Does pt have pain?  Left knee     Bed Mobility  Rolling: SBA  Supine to sit: SBA  Sit to supine: SBA  Scooting: SBA to sitting EOB  supervision   Transfers Sit to stand: SBA  Stand to sit: SBA  Stand pivot: SBA with w/w  supervision   Ambulation    70 feet and 150 feet with w/w SB/supervision   200+ feet with w/w Supervision    Stair negotiation: ascended and descended  4 steps with 2 rails CGA  4 steps with 2 rails supervision   ROM Left LE:  Hip and ankle WFL, knee 5-70 degrees  Right LE:  WFL  Increase left knee ROM 0 - 90 degrees   Strength Left LE:  Hip and knee 3/5, ankle 4/5  Right LE:  Grossly 4/5  Increase left LE strength by 1/2 mm grade   Balance Sitting EOB:  independent  Dynamic Standing: SBA with w/w  Sitting EOB:  independent  Dynamic Standing:  Supervision with w/w   AM-PAC 6 Clicks 33/31       Pt is A & O x 3  Sensation:  Pt reported numbness in B UE and LEs. Therapeutic Exercises:  LAQs and heel slides while sitting up in the chair. Ankle pumps, Heel slides, quad sets, hip abd/add (AAROM), SAQs while pt supine in bed. Gave pt HEP. Patient education  Pt educated on PT objectives during eval and while in the hospital, HEP, hand placement during transfers, car transfers, walker usage and safety, weight bearing, stair negotiation. Patient response to education:   Pt verbalized understanding Pt demonstrated skill Pt requires further education in this area   yes With cueing yes     ASSESSMENT:    Conditions Requiring Skilled Therapeutic Intervention:    [x]Decreased strength     [x]Decreased ROM  [x]Decreased functional mobility  [x]Decreased balance   [x]Decreased endurance   []Decreased posture  [x]Decreased sensation  []Decreased coordination   []Decreased vision  []Decreased safety awareness   [x]Increased pain       Comments:  Pt found sitting up in the chair. Cueing required for hand placement during transfers. No LOB during ambulation. Cueing required for gait sequencing and walker usage. Pt ambulates with step to pattern. No difficulty with stair negotiation. Educated pt about car transfers which pt verbalized understanding. Went through Smartjog with pt. All pt's questions were answered prior to end of treatment session. At end of eval, pt left sitting up in the chair with call light in reach and wife present. Treatment:  Patient practiced and was instructed in the following treatment:     Functional mobility and therapeutic exercises performed as documented above. Pt's/ family goals   1. None stated    Prognosis is good for reaching above PT goals. Patient and or family understand(s) diagnosis, prognosis, and plan of care.   yes    PHYSICAL THERAPY PLAN OF CARE:    PT POC is established based on physician order and patient diagnosis     Diagnosis:  S/P total knee arthroplasty, left [Z96.652]    Current Treatment Recommendations:     [x] Strengthening to improve independence with functional mobility   [x] ROM to improve independence with functional mobility   [x] Balance Training to improve static/dynamic balance and to reduce fall risk  [x] Endurance Training to improve activity tolerance during functional mobility   [x] Transfer Training to improve safety and independence with all functional transfers   [x] Gait Training to improve gait mechanics, endurance and assess need for appropriate assistive device  [x] Stair Training in preparation for safe discharge home and/or into the community   [] Positioning to prevent skin breakdown and contractures  [x] Safety and Education Training   [x] Patient/Caregiver Education   [x] HEP  [] Other     PT long term treatment goals are located in above grid    Frequency of treatments: BID . Time in  0839  Time out  0916    Total Treatment Time  30 minutes     Evaluation Time includes thorough review of current medical information, gathering information on past medical history/social history and prior level of function, completion of standardized testing/informal observation of tasks, assessment of data and education on plan of care and goals.     CPT codes:  [x] Low Complexity PT evaluation 91862  [] Moderate Complexity PT evaluation 06258  [] High Complexity PT evaluation 74600  [] PT Re-evaluation 73051  [x] Therapeutic activities 34232 20minutes  [x] Therapeutic exercises 29622 10 minutes      Suha Huff, PT, DPT  PT [id number]

## 2021-11-09 NOTE — PROGRESS NOTES
NURSES NOTE [facility]  POD # 1 LEFT KNEE TOTAL JOINT ARTHROPLASTY -  KIRK ROBOT ASSISTED     Subjective: \"I'm doing good. \" \"I want to go home today, my wife is on her way up here. \"                                                                                                                                           Objective/Assessment: Patient sitting up in the chair    Visited patient with Dr. Naman Can. [facility] who examined patient and discussed discharge plans with the patient    Neuro:  Alert and oriented, pleasant and conversant  Appearance:  No distress   Pain Control:  Effective with prescribed pain relievers  Breathing:  Patient denies CP or SOB  Saline francesco:  Maintained   Appetite:  Restored   Voiding   Assisted into the BR several times during the night. Patient voiding qs and without difficulty  Abdomen:  Soft   Expelling Flatus:  No   BM:  No   Dressing:  Aquacel and Island dressing maintained, C/D/I  Motion:  Knee flexion 90 degrees sitting in the chair  Compartments:  Thigh / calf soft and non tender   Neuro / Circulatory:  Intact          /80   Pulse 80   Temp 98 °F (36.7 °C)   Resp 16   Ht 5' 6\" (1.676 m)   Wt 199 lb (90.3 kg)   SpO2 93%   BMI 32.12 kg/m²     Recent Labs     11/09/21  0415   HGB 14.9   HCT 43.7   Pre  Op 11/03/2021:  Hgb. 17.1  Hct. 51.6  EBL in OR minimal    Plan:   Discharge home today with Home Physical Therapy 3 times a week until seen in office on Thursday, November 18 th. Home Physical Therapy is needed due to:  1.) osteoarthritis knee, 2.) fresh post op LEFT KNEE TOTAL JOINT ARTHROPLASTY, 3.)  post operative weakness and / or pain, 4.) for balance,gait and transfer, 5.) unable to leave home without maximum effort and requires assistance of wheelchair or walker  [facility]: evaluate and treat for strength training, gait training, balance training, safety training, functional mobility, home assessment due to fall risk.   Patient has no DME needs  Discharge instructions discussed with patient including:   S/S DVT and Infection - Patient instructed to call the office  S/S PE - Patient instructed to go to ER immediately  Follow Patient Education Guide: Iron Replacement - Handout given to patient pre op. Patient instructed to continue eating foods rich in iron and vitamin C for one month post op   To prevent constipation while taking opioid pain relievers; patient instructed to drink plenty of water, eat fruits and vegetables, increase the fiber in his diet, drink prune, plum or apple juice, eat dried fruit such as prunes. May take OTC aids: Stool softeners, suppositories, Fleet enema. If constipation persists: Contact PCP    Remove Island dressing on Wednesday, November 10 th and remove Aquacel on Monday, November 15 th  Patient expresses understanding and is in agreement with the plan  Prescription for pain reliever - Percocet 5/325 (28 tablets / 0 refills) take one tablet every 6 hours as needed for pain up to 7 days  May take Tylenol for less pain (if not allergic) 1000 mg 3 times a day or every 8 hours not to exceed 3000 mg / 24 hrs. Call office for approval from surgeon before resuming any arthritis medication while taking aspirin. DVT Prophylaxis: Continue lower extremity circulation exercises when at rest, frequent ambulation, JO ANN hose and medication - one adult enteric coated 81 mg aspirin tablet twice a day  Patient will call office for any questions, concerns or problems [phone], ext. [phone]  Follow up with Dr. Mikhail Gerard. [facility] on Thursday, November 18 th at 2:45 pm for stitch removal, x-ray, exam by the surgeon and a prescription for Out Patient Physical Therapy    Muan Otero, RN RN, BSN, ONC, scribe for Dr. Mikhail Gerard.  [facility] - Orthopedic Surgeon  11/9/2021 7:59 AM

## 2021-11-18 ENCOUNTER — OFFICE VISIT (OUTPATIENT)
Dept: ORTHOPEDIC SURGERY | Age: 61
End: 2021-11-18

## 2021-11-18 VITALS — BODY MASS INDEX: 31.98 KG/M2 | WEIGHT: 199 LBS | HEIGHT: 66 IN

## 2021-11-18 DIAGNOSIS — Z96.652 S/P TKR (TOTAL KNEE REPLACEMENT), LEFT: Primary | ICD-10-CM

## 2021-11-18 PROCEDURE — 99024 POSTOP FOLLOW-UP VISIT: CPT | Performed by: ORTHOPAEDIC SURGERY

## 2021-11-18 RX ORDER — NAPROXEN SODIUM 220 MG
220 TABLET ORAL 2 TIMES DAILY WITH MEALS
COMMUNITY

## 2021-11-19 NOTE — PROGRESS NOTES
Chief Complaint:   Chief Complaint   Patient presents with    Post-Op Check     Post-op Lt TKA 11/8/2021. Dai Saravia underwent uneventful robotic assisted left total knee arthroplasty 10 days ago, doing very well at this point, reports tolerable pain managing it with Aleve. Denies fever chills sweats chest pain or dyspnea. Bearing full weight using single cane for balance. Tolerating home physical therapy very well and feels comfortable doing exercises from here on out on his own. Allergies; medications; past medical, surgical, family, and social history; and problem list have been reviewed today and updated as indicated in this encounter seen below. Exam: Left lower leg does show some nonpitting edema, calf is soft nontender negative Homans. Knee incisions are healing without erythema fluctuance or drainage, there is expected degree of periarticular soft tissue swelling minimal warmth and minimal effusion. Knee is well aligned in slight valgus comparable to his contralateral knee, excellent medial lateral stability and normal kinematics with range of motion from 0 to almost 120 degrees of flexion today. Patella tracking neutral without crepitus. Radiographs: AP lateral x-rays of the left knee today show total knee components to be well fit and fixed in proper alignment with restoration of what appears to be native femoral tibial valgus. Ankur Kim was seen today for post-op check. Diagnoses and all orders for this visit:    S/P TKR (total knee replacement), left  -     XR KNEE LEFT (1-2 VIEWS)       Patient doing very well, sutures removed from the tibial pin sites, patient was instructed on home exercise continue with JO ANN hose to control the swelling and low-dose aspirin, activity precautions against injury also reviewed and follow-up in 1 month for clinical exam.    Return in about 1 month (around 12/18/2021).      Current Outpatient Medications   Medication Sig Dispense Refill  naproxen sodium (ALEVE) 220 MG tablet Take 220 mg by mouth 2 times daily (with meals)      aspirin 81 MG EC tablet Take 1 tablet by mouth 2 times daily 60 tablet 0    furosemide (LASIX) 20 MG tablet TAKE ONE TABLET BY MOUTH EVERY DAY      Multiple Vitamins-Minerals (PRESERVISION AREDS) CAPS Take by mouth daily       tamsulosin (FLOMAX) 0.4 MG capsule tamsulosin 0.4 mg capsule   TAKE ONE CAPSULE BY MOUTH EVERY DAY      gabapentin (NEURONTIN) 400 MG capsule Take 400 mg by mouth 3 times daily. No current facility-administered medications for this visit.        Patient Active Problem List   Diagnosis    Acute kidney injury ([facility] [address]    High anion gap metabolic acidosis    Essential hypertension    Hyperlipidemia LDL goal <100    S/P total knee arthroplasty, left    Primary osteoarthritis of left knee       Past Medical History:   Diagnosis Date    Hyperlipidemia     Hypertension     Knee pain     left For OR 11-8-21    Leaky heart valve        Past Surgical History:   Procedure Laterality Date    BACK SURGERY  2008    disc    CERVICAL FUSION  2016    HAND SURGERY Right 02/04/2015    right hand debridment second proximal phalanx    TOTAL KNEE ARTHROPLASTY Left 11/8/2021    ROBOTIC ASSISTED KIRK LEFT KNEE TOTAL ARTHROPLASTY   PNB performed by Debora Hall MD at [facility] OR       No Known Allergies    Social History     Socioeconomic History    Marital status:      Spouse name: None    Number of children: None    Years of education: None    Highest education level: None   Occupational History    None   Tobacco Use    Smoking status: Current Every Day Smoker     Packs/day: 2.00     Years: 40.00     Pack years: 80.00    Smokeless tobacco: Never Used   Substance and Sexual Activity    Alcohol use: Yes     Comment: rare    Drug use: No    Sexual activity: None   Other Topics Concern    None   Social History Narrative    None     Social Determinants of Health     Financial Resource Strain:     Difficulty of Paying Living Expenses: Not on file   Food Insecurity:     Worried About Running Out of Food in the Last Year: Not on file    Pat of Food in the Last Year: Not on file   Transportation Needs:     Lack of Transportation (Medical): Not on file    Lack of Transportation (Non-Medical): Not on file   Physical Activity:     Days of Exercise per Week: Not on file    Minutes of Exercise per Session: Not on file   Stress:     Feeling of Stress : Not on file   Social Connections:     Frequency of Communication with Friends and Family: Not on file    Frequency of Social Gatherings with Friends and Family: Not on file    Attends Restorationist Services: Not on file    Active Member of [address] [place] [address] [employer] or Organizations: Not on file    Attends Club or Organization Meetings: Not on file    Marital Status: Not on file   Intimate Partner Violence:     Fear of Current or Ex-Partner: Not on file    Emotionally Abused: Not on file    Physically Abused: Not on file    Sexually Abused: Not on file   Housing Stability:     Unable to Pay for Housing in the Last Year: Not on file    Number of [place] in the Last Year: Not on file    Unstable Housing in the Last Year: Not on file       History reviewed. No pertinent family history. Review of Systems  As follows except as previously noted in HPI:  Constitutional: Negative for chills, diaphoresis, fatigue, fever and unexpected weight change. Respiratory: Negative for cough, shortness of breath and wheezing. Cardiovascular: Negative for chest pain and palpitations. Neurological: Negative for dizziness, syncope, cephalgia. GI / : negative  Musculoskeletal: see HPI       Objective:   Physical Exam   Constitutional: Oriented to person, place, and time. and appears well-developed and well-nourished. :   Head: Normocephalic and atraumatic. Eyes: EOM are normal.   Neck: Neck supple. Cardiovascular: Normal rate and regular rhythm. Pulmonary/Chest: Effort normal. No stridor. No respiratory distress, no wheezes. Abdominal:  No abnormal distension. Neurological: Alert and oriented to person, place, and time. Skin: Skin is warm and dry. Psychiatric: Normal mood and affect.  Behavior is normal. Thought content normal.    11/18/2021  7:34 PM

## 2021-12-23 ENCOUNTER — OFFICE VISIT (OUTPATIENT)
Dept: ORTHOPEDIC SURGERY | Age: 61
End: 2021-12-23

## 2021-12-23 VITALS — BODY MASS INDEX: 31.98 KG/M2 | WEIGHT: 199 LBS | HEIGHT: 66 IN

## 2021-12-23 DIAGNOSIS — Z96.652 S/P TKR (TOTAL KNEE REPLACEMENT), LEFT: Primary | ICD-10-CM

## 2021-12-23 PROCEDURE — 99024 POSTOP FOLLOW-UP VISIT: CPT | Performed by: ORTHOPAEDIC SURGERY

## 2021-12-23 NOTE — PROGRESS NOTES
tamsulosin (FLOMAX) 0.4 MG capsule tamsulosin 0.4 mg capsule   TAKE ONE CAPSULE BY MOUTH EVERY DAY      gabapentin (NEURONTIN) 400 MG capsule Take 400 mg by mouth 3 times daily. No current facility-administered medications for this visit. Patient Active Problem List   Diagnosis    Acute kidney injury ([address]    High anion gap metabolic acidosis    Essential hypertension    Hyperlipidemia LDL goal <100    S/P total knee arthroplasty, left    Primary osteoarthritis of left knee       Past Medical History:   Diagnosis Date    Hyperlipidemia     Hypertension     Knee pain     left For OR 11-8-21    Leaky heart valve        Past Surgical History:   Procedure Laterality Date    BACK SURGERY  2008    disc    CERVICAL FUSION  2016    HAND SURGERY Right 02/04/2015    right hand debridment second proximal phalanx    TOTAL KNEE ARTHROPLASTY Left 11/8/2021    ROBOTIC ASSISTED KIRK LEFT KNEE TOTAL ARTHROPLASTY   PNB performed by Maria De Jesus Cage MD at [facility] OR       No Known Allergies    Social History     Socioeconomic History    Marital status:      Spouse name: None    Number of children: None    Years of education: None    Highest education level: None   Occupational History    None   Tobacco Use    Smoking status: Current Every Day Smoker     Packs/day: 2.00     Years: 40.00     Pack years: 80.00    Smokeless tobacco: Never Used   Substance and Sexual Activity    Alcohol use: Yes     Comment: rare    Drug use: No    Sexual activity: None   Other Topics Concern    None   Social History Narrative    None     Social Determinants of Health     Financial Resource Strain:     Difficulty of Paying Living Expenses: Not on file   Food Insecurity:     Worried About Running Out of Food in the Last Year: Not on file    Pat of Food in the Last Year: Not on file   Transportation Needs:     Lack of Transportation (Medical): Not on file    Lack of Transportation (Non-Medical):  Not on file   Physical Activity:     Days of Exercise per Week: Not on file    Minutes of Exercise per Session: Not on file   Stress:     Feeling of Stress : Not on file   Social Connections:     Frequency of Communication with Friends and Family: Not on file    Frequency of Social Gatherings with Friends and Family: Not on file    Attends Zoroastrian Services: Not on file    Active Member of [address] [place] [address] or Organizations: Not on file    Attends Club or Organization Meetings: Not on file    Marital Status: Not on file   Intimate Partner Violence:     Fear of Current or Ex-Partner: Not on file    Emotionally Abused: Not on file    Physically Abused: Not on file    Sexually Abused: Not on file   Housing Stability:     Unable to Pay for Housing in the Last Year: Not on file    Number of [place] in the Last Year: Not on file    Unstable Housing in the Last Year: Not on file       History reviewed. No pertinent family history. Review of Systems  As follows except as previously noted in HPI:  Constitutional: Negative for chills, diaphoresis, fatigue, fever and unexpected weight change. Respiratory: Negative for cough, shortness of breath and wheezing. Cardiovascular: Negative for chest pain and palpitations. Neurological: Negative for dizziness, syncope, cephalgia. GI / : negative  Musculoskeletal: see HPI       Objective:   Physical Exam   Constitutional: Oriented to person, place, and time. and appears well-developed and well-nourished. :   Head: Normocephalic and atraumatic. Eyes: EOM are normal.   Neck: Neck supple. Cardiovascular: Normal rate and regular rhythm. Pulmonary/Chest: Effort normal. No stridor. No respiratory distress, no wheezes. Abdominal:  No abnormal distension. Neurological: Alert and oriented to person, place, and time. Skin: Skin is warm and dry. Psychiatric: Normal mood and affect.  Behavior is normal. Thought content normal.    12/23/2021  4:37 PM

## (undated) DEVICE — TOWEL,OR,DSP,ST,BLUE,STD,6/PK,12PK/CS: Brand: MEDLINE

## (undated) DEVICE — PACK PROCEDURE SURG GEN CUST

## (undated) DEVICE — BANDAGE COMPR W6INXL12FT SMOOTH FOR LIMB EXSANG ESMARCH

## (undated) DEVICE — CORD RETRCT SIL

## (undated) DEVICE — STANDARD HYPODERMIC NEEDLE,POLYPROPYLENE HUB: Brand: MONOJECT

## (undated) DEVICE — RETRACTOR KNE PCA

## (undated) DEVICE — TRAY STRYKER MAKO LEG POSITIONER INSTR

## (undated) DEVICE — SOLUTION IV IRRIG POUR BRL 0.9% SODIUM CHL 2F7124

## (undated) DEVICE — TUBE IRRIG HNDPC HI FLO TP INTRPULS W/SUCTION TUBE

## (undated) DEVICE — SWABSTICK MEDICATED L4IN BENZ TINC SKIN PREP APLICARE

## (undated) DEVICE — SET STRYKER GLUE GUN

## (undated) DEVICE — STRIP,CLOSURE,WOUND,MEDI-STRIP,1/2X4: Brand: MEDLINE

## (undated) DEVICE — ELECTRODE PT RET AD L9FT HI MOIST COND ADH HYDRGEL CORDED

## (undated) DEVICE — SOLUTION IV IRRIG WATER 1000ML POUR BRL 2F7114

## (undated) DEVICE — KIT INT FIX FEM TIB CKPT MAKOPLASTY

## (undated) DEVICE — PIN BNE FIX TEMP L140MM DIA4MM MAKO

## (undated) DEVICE — Z INACTIVE USE 2660664 SOLUTION IRRIG 3000ML 0.9% SOD CHL USP UROMATIC PLAS CONT

## (undated) DEVICE — Z DISCONTINUED PER MEDLINE USE 2741943 DRESSING AQUACEL 10 IN ALG W9XL25CM SIL CVR WTRPRF VIR BACT BARR ANTIMIC

## (undated) DEVICE — GLOVE ORANGE PI 8   MSG9080

## (undated) DEVICE — DRESSING FOAM W4XL12IN AG SIL ADH ANTIMIC POSTOP OPTIFOAM

## (undated) DEVICE — TAPE ADH W3INXL10YD WHT COT WVN BK POWERFUL RUB BASE HIGHLY

## (undated) DEVICE — KIT TRK KNEE PROC VIZADISC

## (undated) DEVICE — COVER HNDL LT DISP

## (undated) DEVICE — SPONGE LAP W18XL18IN WHT COT 4 PLY FLD STRUNG RADPQ DISP ST

## (undated) DEVICE — BNDG,ELSTC,MATRIX,STRL,6"X5YD,LF,HOOK&LP: Brand: MEDLINE

## (undated) DEVICE — TRAY STRYKER MAKO TOTAL KNEE ARRAY

## (undated) DEVICE — STERILE PVP: Brand: MEDLINE INDUSTRIES, INC.

## (undated) DEVICE — SET ORTHO STD STORTSTD1

## (undated) DEVICE — GOWN,SIRUS,POLYRNF,BRTHSLV,XL,30/CS: Brand: MEDLINE

## (undated) DEVICE — BOOT POS LEG DEMAYO

## (undated) DEVICE — PIN BNE FIX TEMP L110MM DIA4MM MAKO

## (undated) DEVICE — TRAY SYSTEM 7 DRILL REUSABLE

## (undated) DEVICE — TOTAL KNEE PK

## (undated) DEVICE — TRAY STRYKER MAKO TOTAL KNEE POWER

## (undated) DEVICE — INSTRUMENT SYSTEM 7 BATTERY REUSABLE

## (undated) DEVICE — 4-PORT MANIFOLD: Brand: NEPTUNE 2

## (undated) DEVICE — ZIMMER® STERILE DISPOSABLE TOURNIQUET CUFF WITH PLC, DUAL PORT, SINGLE BLADDER, 42 IN. (107 CM)

## (undated) DEVICE — DECANTER: Brand: UNBRANDED

## (undated) DEVICE — TRAY LAMBOTS REUSABLE

## (undated) DEVICE — KIT DRP FOR RIO ROBOTIC ARM ASST SYS

## (undated) DEVICE — BOWL AND CEMENT CARTRIDGE WITH BREAKAWAY FEMORAL NOZZLE: Brand: ACM

## (undated) DEVICE — TUBING, SUCTION, 9/32" X 10', STRAIGHT: Brand: MEDLINE

## (undated) DEVICE — APPLICATOR PREP 26ML 0.7% IOD POVACRYLEX 74% ISO ALC ST

## (undated) DEVICE — SOLUTION IV 500ML 0.9% SOD CHL PH 5 INJ USP VIAFLX PLAS

## (undated) DEVICE — SYRINGE MED 50ML LUERLOCK TIP

## (undated) DEVICE — KIT PLT RATIO DISPNS KT 2IN CANN TIP SPRY TIP DISP MAGELLAN

## (undated) DEVICE — DOUBLE BASIN SET: Brand: MEDLINE INDUSTRIES, INC.

## (undated) DEVICE — INSTRUMENT GRADUATE REUSABLE